# Patient Record
Sex: FEMALE | Race: WHITE | Employment: FULL TIME | URBAN - METROPOLITAN AREA
[De-identification: names, ages, dates, MRNs, and addresses within clinical notes are randomized per-mention and may not be internally consistent; named-entity substitution may affect disease eponyms.]

---

## 2017-05-09 RX ORDER — LORAZEPAM 1 MG/1
TABLET ORAL
Qty: 14 TABLET | Refills: 0 | Status: SHIPPED | OUTPATIENT
Start: 2017-05-09 | End: 2017-05-31 | Stop reason: SDUPTHER

## 2017-05-10 RX ORDER — LORAZEPAM 1 MG/1
TABLET ORAL
Qty: 30 TABLET | Refills: 0 | OUTPATIENT
Start: 2017-05-10

## 2017-05-31 ENCOUNTER — OFFICE VISIT (OUTPATIENT)
Dept: INTERNAL MEDICINE | Age: 47
End: 2017-05-31

## 2017-05-31 VITALS
WEIGHT: 136 LBS | BODY MASS INDEX: 23.22 KG/M2 | DIASTOLIC BLOOD PRESSURE: 78 MMHG | HEIGHT: 64 IN | SYSTOLIC BLOOD PRESSURE: 110 MMHG

## 2017-05-31 DIAGNOSIS — Z23 NEED FOR DTAP VACCINATION: ICD-10-CM

## 2017-05-31 DIAGNOSIS — F41.0 PANIC DISORDER: ICD-10-CM

## 2017-05-31 DIAGNOSIS — G43.719 INTRACTABLE CHRONIC MIGRAINE WITHOUT AURA AND WITHOUT STATUS MIGRAINOSUS: ICD-10-CM

## 2017-05-31 DIAGNOSIS — Z00.00 ROUTINE GENERAL MEDICAL EXAMINATION AT A HEALTH CARE FACILITY: Primary | ICD-10-CM

## 2017-05-31 DIAGNOSIS — F32.A DEPRESSION, UNSPECIFIED DEPRESSION TYPE: ICD-10-CM

## 2017-05-31 DIAGNOSIS — F51.04 PSYCHOPHYSIOLOGICAL INSOMNIA: ICD-10-CM

## 2017-05-31 PROCEDURE — 93000 ELECTROCARDIOGRAM COMPLETE: CPT | Performed by: INTERNAL MEDICINE

## 2017-05-31 PROCEDURE — 90471 IMMUNIZATION ADMIN: CPT | Performed by: INTERNAL MEDICINE

## 2017-05-31 PROCEDURE — 90715 TDAP VACCINE 7 YRS/> IM: CPT | Performed by: INTERNAL MEDICINE

## 2017-05-31 PROCEDURE — 99396 PREV VISIT EST AGE 40-64: CPT | Performed by: INTERNAL MEDICINE

## 2017-05-31 RX ORDER — HYDROCODONE BITARTRATE AND ACETAMINOPHEN 5; 325 MG/1; MG/1
TABLET ORAL
Qty: 30 TABLET | Refills: 0 | Status: SHIPPED | OUTPATIENT
Start: 2017-05-31 | End: 2018-01-11 | Stop reason: SDUPTHER

## 2017-05-31 RX ORDER — LORAZEPAM 1 MG/1
TABLET ORAL
Qty: 70 TABLET | Refills: 2 | Status: SHIPPED | OUTPATIENT
Start: 2017-05-31 | End: 2017-12-20 | Stop reason: SDUPTHER

## 2017-05-31 RX ORDER — CODEINE/BUTALBITAL/ASA/CAFFEIN 30-50-325
CAPSULE ORAL
Qty: 30 CAPSULE | Refills: 1 | Status: SHIPPED | OUTPATIENT
Start: 2017-05-31 | End: 2017-08-07 | Stop reason: SDUPTHER

## 2017-05-31 ASSESSMENT — ENCOUNTER SYMPTOMS
ABDOMINAL PAIN: 0
BACK PAIN: 0
CONSTIPATION: 0
CHEST TIGHTNESS: 0
WHEEZING: 0
COLOR CHANGE: 0

## 2017-07-21 ENCOUNTER — TELEPHONE (OUTPATIENT)
Dept: INTERNAL MEDICINE | Age: 47
End: 2017-07-21

## 2017-08-08 RX ORDER — CODEINE/BUTALBITAL/ASA/CAFFEIN 30-50-325
CAPSULE ORAL
Qty: 30 CAPSULE | Refills: 1 | Status: SHIPPED | OUTPATIENT
Start: 2017-08-08 | End: 2017-10-22 | Stop reason: SDUPTHER

## 2017-10-25 ENCOUNTER — HOSPITAL ENCOUNTER (OUTPATIENT)
Dept: OTHER | Age: 47
Discharge: HOME | End: 2017-10-25
Attending: PSYCHIATRY & NEUROLOGY | Admitting: PSYCHIATRY & NEUROLOGY

## 2017-10-25 RX ORDER — CODEINE/BUTALBITAL/ASA/CAFFEIN 30-50-325
CAPSULE ORAL
Qty: 30 CAPSULE | Refills: 0 | OUTPATIENT
Start: 2017-10-25 | End: 2017-11-21 | Stop reason: SDUPTHER

## 2017-11-06 ENCOUNTER — TELEPHONE (OUTPATIENT)
Dept: INTERNAL MEDICINE | Age: 47
End: 2017-11-06

## 2017-11-06 RX ORDER — ALPRAZOLAM 0.5 MG/1
TABLET ORAL
Qty: 5 TABLET | Refills: 0 | OUTPATIENT
Start: 2017-11-06 | End: 2018-01-11

## 2017-11-10 ENCOUNTER — TELEPHONE (OUTPATIENT)
Dept: INTERNAL MEDICINE | Age: 47
End: 2017-11-10

## 2017-11-10 NOTE — TELEPHONE ENCOUNTER
Pt states she went to [address] and Rx -xanax was not avaialbal and was not called in (reviewed 11.6.17 med tab )   Pt called needs RX for. .. ALPRAZolam (XANAX) 0.5 MG tablet Sent to . .. Pharm on file . .. Advised Pt to check with Pharm in 24 -48hours . ..  Pls send
rx re-called today  [phone] (home) [phone] (work)    PT INFORMED
Yes

## 2017-12-18 ENCOUNTER — TELEPHONE (OUTPATIENT)
Dept: INTERNAL MEDICINE | Age: 47
End: 2017-12-18

## 2017-12-18 RX ORDER — CODEINE/BUTALBITAL/ASA/CAFFEIN 30-50-325
CAPSULE ORAL
Qty: 30 CAPSULE | Refills: 0 | OUTPATIENT
Start: 2017-12-18 | End: 2018-01-11 | Stop reason: SDUPTHER

## 2017-12-18 NOTE — TELEPHONE ENCOUNTER
Pt called needs RX for. ..butalbital-aspirin-caffeine-codeine (FIORINAL WITH CODEINE) -14-30 MG capsule and ALPRAZolam (XANAX) 0.5 MG tablet  Sent to . .. Pharm on file . .. Advised Pt to check with Pharm in 24-48 hours . ..  Pls send  Pending med check appt 1.2.18    Pt can be reached at [phone]

## 2017-12-20 ENCOUNTER — TELEPHONE (OUTPATIENT)
Dept: INTERNAL MEDICINE | Age: 47
End: 2017-12-20

## 2017-12-20 RX ORDER — LORAZEPAM 2 MG/1
TABLET ORAL
Qty: 30 TABLET | Refills: 0 | OUTPATIENT
Start: 2017-12-20 | End: 2018-01-11 | Stop reason: SDUPTHER

## 2017-12-20 NOTE — TELEPHONE ENCOUNTER
Patient called yesterday for refills on medication she only received the refill on butalbital-aspirin-caffeine-codeine (FIORINAL WITH CODEINE) -54-30 MG capsule     Pt didn't received the refill for LORazepam (ATIVAN) 1 MG tablet 30 day supply   Please resend refill request to [facility]

## 2018-01-11 ENCOUNTER — OFFICE VISIT (OUTPATIENT)
Dept: INTERNAL MEDICINE | Age: 48
End: 2018-01-11

## 2018-01-11 VITALS
DIASTOLIC BLOOD PRESSURE: 80 MMHG | BODY MASS INDEX: 24.41 KG/M2 | TEMPERATURE: 99 F | SYSTOLIC BLOOD PRESSURE: 122 MMHG | WEIGHT: 143 LBS | HEIGHT: 64 IN

## 2018-01-11 DIAGNOSIS — F32.A DEPRESSION, UNSPECIFIED DEPRESSION TYPE: ICD-10-CM

## 2018-01-11 DIAGNOSIS — F41.0 PANIC DISORDER: ICD-10-CM

## 2018-01-11 DIAGNOSIS — G43.719 INTRACTABLE CHRONIC MIGRAINE WITHOUT AURA AND WITHOUT STATUS MIGRAINOSUS: Primary | ICD-10-CM

## 2018-01-11 DIAGNOSIS — F51.04 PSYCHOPHYSIOLOGICAL INSOMNIA: ICD-10-CM

## 2018-01-11 DIAGNOSIS — B34.9 VIRAL SYNDROME: ICD-10-CM

## 2018-01-11 PROCEDURE — 99214 OFFICE O/P EST MOD 30 MIN: CPT | Performed by: INTERNAL MEDICINE

## 2018-01-11 RX ORDER — OSELTAMIVIR PHOSPHATE 75 MG/1
75 CAPSULE ORAL 2 TIMES DAILY
Qty: 14 CAPSULE | Refills: 0 | Status: SHIPPED | OUTPATIENT
Start: 2018-01-11 | End: 2018-01-18

## 2018-01-11 RX ORDER — CODEINE/BUTALBITAL/ASA/CAFFEIN 30-50-325
CAPSULE ORAL
Qty: 30 CAPSULE | Refills: 0 | Status: SHIPPED | OUTPATIENT
Start: 2018-01-11 | End: 2018-02-11

## 2018-01-11 RX ORDER — PROMETHAZINE HYDROCHLORIDE AND CODEINE PHOSPHATE 6.25; 1 MG/5ML; MG/5ML
5 SYRUP ORAL EVERY 4 HOURS PRN
Qty: 240 ML | Refills: 1 | Status: SHIPPED | OUTPATIENT
Start: 2018-01-11 | End: 2018-02-12

## 2018-01-11 RX ORDER — LORAZEPAM 2 MG/1
TABLET ORAL
Qty: 30 TABLET | Refills: 1 | Status: SHIPPED | OUTPATIENT
Start: 2018-01-11 | End: 2018-02-11

## 2018-01-11 RX ORDER — HYDROCODONE BITARTRATE AND ACETAMINOPHEN 5; 325 MG/1; MG/1
TABLET ORAL
Qty: 30 TABLET | Refills: 0 | Status: SHIPPED | OUTPATIENT
Start: 2018-01-11 | End: 2018-07-17 | Stop reason: SDUPTHER

## 2018-01-11 ASSESSMENT — ENCOUNTER SYMPTOMS
COLOR CHANGE: 0
COUGH: 1
CHEST TIGHTNESS: 0
BACK PAIN: 0
WHEEZING: 0
ABDOMINAL PAIN: 0

## 2018-01-11 ASSESSMENT — PATIENT HEALTH QUESTIONNAIRE - PHQ9
SUM OF ALL RESPONSES TO PHQ QUESTIONS 1-9: 0
SUM OF ALL RESPONSES TO PHQ9 QUESTIONS 1 & 2: 0
2. FEELING DOWN, DEPRESSED OR HOPELESS: 0

## 2018-01-11 NOTE — LETTER
· I will protect my prescriptions and medications. I understand that lost or misplaced prescriptions will not be replaced. · I will keep medications only for my own use and will not share them with others. I will keep all medications away from children. · I agree to participate in any medical, psychological or psychiatric assessments recommended by my provider. · I will actively participate in any program designed to improve function, including social, physical, psychological and daily or work activities. 2. I will not use illegal or street drugs or another person's prescription. If I have an       addiction problem with drugs or alcohol and my provider asks me to enter a program       to address this issue, I agree to follow through. Such programs may include:    · 12-Step program and securing a sponsor  · Individual counseling   · Inpatient or outpatient treatment  · Other:___________________________    If in treatment, I will request that a copy of the programs initial evaluation and treatment recommendations be sent to this provider and will not expect refills until that is received. I will also request written monthly updates be sent to this provider to verify my continuing treatment. 3. I will consent to random drug screening to assure I am only taking the prescribed drugs, described in this MEDICATION AGREEMENT. I understand that a drug screen is a laboratory test in which a sample of my urine or blood is checked to see what drugs I have been taking. 4. I will keep all my scheduled appointments. If I need to cancel my appointment I will do so, a minimum of 24 hours before it is scheduled. 5. I understand that this provider may stop prescribing the medications listed if:    · I do not show any improvement in pain or my activity has not improved. · I develop rapid tolerance or loss of improvement as described in my treatment plan. · I develop significant side effects from the medication. · My behavior is inconsistent with the responsibilities outlined above, which may also result in being prevented from receiving further care from this office. Medication Agreement:      I, ____________________________________, have agreed to use the following medications above as instructed by my physician and as stated in this [facility].      Patient Signature:  ______________________        Date:    ______________________    Patient Name Printed: ______________________    Physician Signature:  ______________________           Date:   ______________________      REV. 9-09

## 2018-01-11 NOTE — LETTER
· I will protect my prescriptions and medications. I understand that lost or misplaced prescriptions will not be replaced. · I will keep medications only for my own use and will not share them with others. I will keep all medications away from children. · I agree to participate in any medical, psychological or psychiatric assessments recommended by my provider. · I will actively participate in any program designed to improve function, including social, physical, psychological and daily or work activities. 2. I will not use illegal or street drugs or another person's prescription. If I have an       addiction problem with drugs or alcohol and my provider asks me to enter a program       to address this issue, I agree to follow through. Such programs may include:    · 12-Step program and securing a sponsor  · Individual counseling   · Inpatient or outpatient treatment  · Other:___________________________    If in treatment, I will request that a copy of the programs initial evaluation and treatment recommendations be sent to this provider and will not expect refills until that is received. I will also request written monthly updates be sent to this provider to verify my continuing treatment. 3. I will consent to random drug screening to assure I am only taking the prescribed drugs, described in this MEDICATION AGREEMENT. I understand that a drug screen is a laboratory test in which a sample of my urine or blood is checked to see what drugs I have been taking. 4. I will keep all my scheduled appointments. If I need to cancel my appointment I will do so, a minimum of 24 hours before it is scheduled. 5. I understand that this provider may stop prescribing the medications listed if:    · I do not show any improvement in pain or my activity has not improved. · I develop rapid tolerance or loss of improvement as described in my treatment plan. · I develop significant side effects from the medication.

## 2018-01-11 NOTE — PROGRESS NOTES
content normal.         Assessment and Plan:      Viral syndrome  R/o flu but pro;ylax due to son's issues     Insomnia  Cont lorazepam     Depression  Consider getting counseling again if possible    Panic disorder  Encouraged to seek counseling if doesn't resolve     Intractable chronic migraine without aura  Fair control w curent regimen-avoid norco if at all possible        Encounter Diagnoses   Name Primary?  Intractable chronic migraine without aura and without status migrainosus Yes    Panic disorder     Psychophysiological insomnia     Viral syndrome     Depression, unspecified depression type        No orders of the defined types were placed in this encounter.

## 2018-01-29 ENCOUNTER — TELEPHONE (OUTPATIENT)
Dept: INTERNAL MEDICINE | Age: 48
End: 2018-01-29

## 2018-01-29 DIAGNOSIS — F41.0 PANIC DISORDER: ICD-10-CM

## 2018-01-29 DIAGNOSIS — G43.719 INTRACTABLE CHRONIC MIGRAINE WITHOUT AURA AND WITHOUT STATUS MIGRAINOSUS: ICD-10-CM

## 2018-01-29 DIAGNOSIS — F51.04 PSYCHOPHYSIOLOGICAL INSOMNIA: ICD-10-CM

## 2018-01-29 NOTE — TELEPHONE ENCOUNTER
Harsh calling from [address] would like to be advised if it is ok to fill Norco and butalbital-aspirin-caffeine-codeine last filled on  1.11.18  Aware that Vernon Khanna MD out however message will be sent to be reviewed by doctor on duty

## 2018-01-29 NOTE — TELEPHONE ENCOUNTER
[facility] is calling because there's a drug interaction HYDROcodone-acetaminophen (NORCO) 5-325 MG per tablet and butalbital-aspirin-caffeine-codeine (FIORINAL WITH CODEINE) -37-30 MG capsule     Those 2 medication interact with her LORazepam (ATIVAN) 2 MG tablet     Please call [facility]

## 2018-03-18 DIAGNOSIS — G44.221 CHRONIC TENSION-TYPE HEADACHE, INTRACTABLE: Primary | ICD-10-CM

## 2018-03-19 RX ORDER — CODEINE/BUTALBITAL/ASA/CAFFEIN 30-50-325
CAPSULE ORAL
Qty: 30 CAPSULE | Refills: 0 | Status: SHIPPED | OUTPATIENT
Start: 2018-03-19 | End: 2018-04-18 | Stop reason: SDUPTHER

## 2018-05-20 DIAGNOSIS — G44.221 CHRONIC TENSION-TYPE HEADACHE, INTRACTABLE: ICD-10-CM

## 2018-05-21 RX ORDER — CODEINE/BUTALBITAL/ASA/CAFFEIN 30-50-325
CAPSULE ORAL
Qty: 30 CAPSULE | Refills: 0 | OUTPATIENT
Start: 2018-05-21 | End: 2018-07-02 | Stop reason: SDUPTHER

## 2018-05-21 RX ORDER — LORAZEPAM 2 MG/1
TABLET ORAL
Qty: 30 TABLET | Refills: 0 | OUTPATIENT
Start: 2018-05-21 | End: 2018-07-02 | Stop reason: SDUPTHER

## 2018-06-29 DIAGNOSIS — G43.719 INTRACTABLE CHRONIC MIGRAINE WITHOUT AURA AND WITHOUT STATUS MIGRAINOSUS: Primary | ICD-10-CM

## 2018-06-29 DIAGNOSIS — F41.0 PANIC DISORDER: ICD-10-CM

## 2018-06-29 NOTE — TELEPHONE ENCOUNTER
MD to review upon return. 7/3    [facility] RAN-    05/21/2018 1 05/21/2018 LORAZEPAM 2 MG TABLET 30 30 [place] HAS [id number] TNS(6705) 0 2.00 LME [place]      05/21/2018 1 05/21/2018 BUTALBITAL COMP-CODEINE #3 CAP 30 10 [place] HAS [id number] JPN(7009) 0 13.50 MME [place]

## 2018-07-02 ENCOUNTER — TELEPHONE (OUTPATIENT)
Dept: INTERNAL MEDICINE | Age: 48
End: 2018-07-02

## 2018-07-02 DIAGNOSIS — F41.0 PANIC DISORDER: Primary | ICD-10-CM

## 2018-07-02 DIAGNOSIS — G44.221 CHRONIC TENSION-TYPE HEADACHE, INTRACTABLE: ICD-10-CM

## 2018-07-02 NOTE — TELEPHONE ENCOUNTER
[facility] ran  Fiorinal with codeine 5/21/18  Ativan last filled 5/21/18  Last seen 1/11/18  Next appt 7/17/18

## 2018-07-03 RX ORDER — CODEINE/BUTALBITAL/ASA/CAFFEIN 30-50-325
CAPSULE ORAL
Qty: 30 CAPSULE | Refills: 0 | OUTPATIENT
Start: 2018-07-03 | End: 2018-07-17 | Stop reason: SDUPTHER

## 2018-07-03 RX ORDER — LORAZEPAM 2 MG/1
TABLET ORAL
Qty: 30 TABLET | Refills: 0 | OUTPATIENT
Start: 2018-07-03 | End: 2018-07-17 | Stop reason: SDUPTHER

## 2018-07-03 RX ORDER — CODEINE/BUTALBITAL/ASA/CAFFEIN 30-50-325
CAPSULE ORAL
Qty: 30 CAPSULE | Refills: 2 | OUTPATIENT
Start: 2018-07-03 | End: 2018-07-17

## 2018-07-03 RX ORDER — LORAZEPAM 2 MG/1
TABLET ORAL
Qty: 30 TABLET | Refills: 2 | OUTPATIENT
Start: 2018-07-03 | End: 2018-08-02

## 2018-07-03 NOTE — TELEPHONE ENCOUNTER
Rx's called in to [place]. Spoke with pt. She is scheduled for med check on 7/16, she will schedule a physical when she is here.

## 2018-07-17 ENCOUNTER — OFFICE VISIT (OUTPATIENT)
Dept: INTERNAL MEDICINE | Age: 48
End: 2018-07-17

## 2018-07-17 VITALS
WEIGHT: 141 LBS | SYSTOLIC BLOOD PRESSURE: 122 MMHG | BODY MASS INDEX: 24.07 KG/M2 | DIASTOLIC BLOOD PRESSURE: 82 MMHG | HEIGHT: 64 IN

## 2018-07-17 DIAGNOSIS — F41.0 PANIC DISORDER: ICD-10-CM

## 2018-07-17 DIAGNOSIS — K59.01 SLOW TRANSIT CONSTIPATION: ICD-10-CM

## 2018-07-17 DIAGNOSIS — G43.719 INTRACTABLE CHRONIC MIGRAINE WITHOUT AURA AND WITHOUT STATUS MIGRAINOSUS: ICD-10-CM

## 2018-07-17 DIAGNOSIS — F51.04 PSYCHOPHYSIOLOGICAL INSOMNIA: ICD-10-CM

## 2018-07-17 DIAGNOSIS — F32.A DEPRESSION, UNSPECIFIED DEPRESSION TYPE: ICD-10-CM

## 2018-07-17 PROBLEM — B34.9 VIRAL SYNDROME: Status: RESOLVED | Noted: 2018-01-11 | Resolved: 2018-07-17

## 2018-07-17 PROCEDURE — 99214 OFFICE O/P EST MOD 30 MIN: CPT | Performed by: INTERNAL MEDICINE

## 2018-07-17 RX ORDER — LORAZEPAM 2 MG/1
TABLET ORAL
Qty: 30 TABLET | Refills: 1 | Status: SHIPPED | OUTPATIENT
Start: 2018-07-17 | End: 2018-10-01

## 2018-07-17 RX ORDER — HYDROCODONE BITARTRATE AND ACETAMINOPHEN 5; 325 MG/1; MG/1
TABLET ORAL
Qty: 30 TABLET | Refills: 0 | Status: SHIPPED | OUTPATIENT
Start: 2018-07-17 | End: 2018-08-17

## 2018-07-17 RX ORDER — CODEINE/BUTALBITAL/ASA/CAFFEIN 30-50-325
CAPSULE ORAL
Qty: 30 CAPSULE | Refills: 1 | Status: SHIPPED | OUTPATIENT
Start: 2018-07-17 | End: 2018-09-17

## 2018-07-17 ASSESSMENT — ENCOUNTER SYMPTOMS
BACK PAIN: 0
CHEST TIGHTNESS: 0
WHEEZING: 0
ABDOMINAL PAIN: 0
COLOR CHANGE: 0

## 2018-10-15 ENCOUNTER — TELEPHONE (OUTPATIENT)
Dept: INTERNAL MEDICINE CLINIC | Age: 48
End: 2018-10-15

## 2018-10-15 RX ORDER — CYCLOBENZAPRINE HCL 10 MG
10 TABLET ORAL 3 TIMES DAILY PRN
Qty: 30 TABLET | Refills: 1 | Status: SHIPPED | OUTPATIENT
Start: 2018-10-15 | End: 2018-10-25

## 2018-10-15 RX ORDER — DICLOFENAC SODIUM 75 MG/1
75 TABLET, DELAYED RELEASE ORAL 2 TIMES DAILY WITH MEALS
Qty: 30 TABLET | Refills: 1 | Status: SHIPPED | OUTPATIENT
Start: 2018-10-15 | End: 2021-10-05 | Stop reason: ALTCHOICE

## 2018-10-15 NOTE — TELEPHONE ENCOUNTER
Pt stated she threw her back out last night and now she is in so much pain and would like to know if something could be called into her pharmacy? [facility] [address] [facility], [facility] [phone]    pls advise. ..  Pt would like a call back regarding this matter

## 2018-12-06 ENCOUNTER — TELEPHONE (OUTPATIENT)
Dept: INTERNAL MEDICINE CLINIC | Age: 48
End: 2018-12-06

## 2018-12-06 NOTE — TELEPHONE ENCOUNTER
LORazepam (ATIVAN) 2 MG tablet- pt stated rx was denied and she would like to know why. .. pls advise    [facility] [address] [place] [address], [address] [place] [address] -  [phone]

## 2019-01-03 ENCOUNTER — TELEPHONE (OUTPATIENT)
Dept: INTERNAL MEDICINE CLINIC | Age: 49
End: 2019-01-03

## 2019-01-03 DIAGNOSIS — B34.9 VIRAL SYNDROME: ICD-10-CM

## 2019-01-03 RX ORDER — PROMETHAZINE HYDROCHLORIDE AND CODEINE PHOSPHATE 6.25; 1 MG/5ML; MG/5ML
5 SYRUP ORAL EVERY 4 HOURS PRN
Qty: 240 ML | Refills: 0 | OUTPATIENT
Start: 2019-01-03 | End: 2019-03-27

## 2019-01-14 DIAGNOSIS — F51.04 PSYCHOPHYSIOLOGICAL INSOMNIA: Primary | ICD-10-CM

## 2019-01-15 RX ORDER — LORAZEPAM 2 MG/1
TABLET ORAL
Qty: 30 TABLET | Refills: 0 | OUTPATIENT
Start: 2019-01-15 | End: 2019-03-27 | Stop reason: SDUPTHER

## 2019-02-14 ENCOUNTER — TELEPHONE (OUTPATIENT)
Dept: INTERNAL MEDICINE CLINIC | Age: 49
End: 2019-02-14

## 2019-02-21 ENCOUNTER — TELEPHONE (OUTPATIENT)
Dept: INTERNAL MEDICINE CLINIC | Age: 49
End: 2019-02-21

## 2019-03-27 ENCOUNTER — OFFICE VISIT (OUTPATIENT)
Dept: INTERNAL MEDICINE CLINIC | Age: 49
End: 2019-03-27
Payer: COMMERCIAL

## 2019-03-27 VITALS
DIASTOLIC BLOOD PRESSURE: 70 MMHG | BODY MASS INDEX: 23.73 KG/M2 | SYSTOLIC BLOOD PRESSURE: 110 MMHG | WEIGHT: 139 LBS | HEIGHT: 64 IN

## 2019-03-27 DIAGNOSIS — F41.0 PANIC DISORDER: ICD-10-CM

## 2019-03-27 DIAGNOSIS — G43.719 INTRACTABLE CHRONIC MIGRAINE WITHOUT AURA AND WITHOUT STATUS MIGRAINOSUS: ICD-10-CM

## 2019-03-27 DIAGNOSIS — Z12.9 CANCER SCREENING: ICD-10-CM

## 2019-03-27 DIAGNOSIS — H35.9: Primary | ICD-10-CM

## 2019-03-27 DIAGNOSIS — D68.59 HYPERCOAGULABLE STATE (HCC): ICD-10-CM

## 2019-03-27 DIAGNOSIS — F32.A DEPRESSION, UNSPECIFIED DEPRESSION TYPE: ICD-10-CM

## 2019-03-27 DIAGNOSIS — F51.04 PSYCHOPHYSIOLOGICAL INSOMNIA: ICD-10-CM

## 2019-03-27 PROCEDURE — 99396 PREV VISIT EST AGE 40-64: CPT | Performed by: INTERNAL MEDICINE

## 2019-03-27 RX ORDER — LORAZEPAM 2 MG/1
TABLET ORAL
Qty: 30 TABLET | Refills: 1 | Status: SHIPPED | OUTPATIENT
Start: 2019-03-27 | End: 2019-05-27

## 2019-03-27 RX ORDER — KETOROLAC TROMETHAMINE 30 MG/ML
INJECTION, SOLUTION INTRAMUSCULAR; INTRAVENOUS
Qty: 4 ML | Refills: 0 | Status: SHIPPED | OUTPATIENT
Start: 2019-03-27 | End: 2020-01-28

## 2019-03-27 RX ORDER — HYDROCODONE BITARTRATE AND ACETAMINOPHEN 5; 325 MG/1; MG/1
1 TABLET ORAL EVERY 8 HOURS PRN
Qty: 30 TABLET | Refills: 0 | Status: SHIPPED | OUTPATIENT
Start: 2019-03-27 | End: 2019-10-31 | Stop reason: SDUPTHER

## 2019-03-27 RX ORDER — CODEINE/BUTALBITAL/ASA/CAFFEIN 30-50-325
1 CAPSULE ORAL EVERY 6 HOURS PRN
Qty: 30 CAPSULE | Refills: 0 | Status: SHIPPED | OUTPATIENT
Start: 2019-03-27 | End: 2019-04-26

## 2019-03-27 ASSESSMENT — ENCOUNTER SYMPTOMS
COLOR CHANGE: 0
ABDOMINAL PAIN: 0
CONSTIPATION: 0
WHEEZING: 0
CHEST TIGHTNESS: 0
BACK PAIN: 0

## 2019-03-27 ASSESSMENT — PATIENT HEALTH QUESTIONNAIRE - PHQ9
1. LITTLE INTEREST OR PLEASURE IN DOING THINGS: 1
SUM OF ALL RESPONSES TO PHQ QUESTIONS 1-9: 1
2. FEELING DOWN, DEPRESSED OR HOPELESS: 0
SUM OF ALL RESPONSES TO PHQ9 QUESTIONS 1 & 2: 1
SUM OF ALL RESPONSES TO PHQ QUESTIONS 1-9: 1

## 2019-03-28 DIAGNOSIS — G43.719 INTRACTABLE CHRONIC MIGRAINE WITHOUT AURA AND WITHOUT STATUS MIGRAINOSUS: ICD-10-CM

## 2019-04-08 ENCOUNTER — TELEPHONE (OUTPATIENT)
Dept: INTERNAL MEDICINE CLINIC | Age: 49
End: 2019-04-08

## 2019-04-08 NOTE — TELEPHONE ENCOUNTER
Patient is depressed and would like a prescription to take the edge off. Patient is crying and said she needs something.   Patient states she is seeing a therapist.     [facility] [address] [place] [address], [address] [place] [address] -  [phone]  Associate Signed OrdersProvidersCurrent Interactions

## 2019-04-08 NOTE — TELEPHONE ENCOUNTER
This is not something we can do over the phone-she needs to come in to discuss her symptoms and we can decide what med is best for her- OK to squeeze her in whenever its best for her-I know she was just here but I really can't start  Antidepressants without a discussion/office visit

## 2019-04-11 ENCOUNTER — OFFICE VISIT (OUTPATIENT)
Dept: INTERNAL MEDICINE CLINIC | Age: 49
End: 2019-04-11
Payer: COMMERCIAL

## 2019-04-11 VITALS
BODY MASS INDEX: 23.56 KG/M2 | WEIGHT: 138 LBS | HEIGHT: 64 IN | DIASTOLIC BLOOD PRESSURE: 80 MMHG | SYSTOLIC BLOOD PRESSURE: 122 MMHG

## 2019-04-11 DIAGNOSIS — F32.A DEPRESSION, UNSPECIFIED DEPRESSION TYPE: ICD-10-CM

## 2019-04-11 DIAGNOSIS — H35.9: ICD-10-CM

## 2019-04-11 PROCEDURE — 99213 OFFICE O/P EST LOW 20 MIN: CPT | Performed by: INTERNAL MEDICINE

## 2019-04-11 PROCEDURE — G0444 DEPRESSION SCREEN ANNUAL: HCPCS | Performed by: INTERNAL MEDICINE

## 2019-04-11 RX ORDER — BUPROPION HYDROCHLORIDE 150 MG/1
150 TABLET, EXTENDED RELEASE ORAL 2 TIMES DAILY
Qty: 60 TABLET | Refills: 3 | Status: SHIPPED | OUTPATIENT
Start: 2019-04-11 | End: 2021-10-05 | Stop reason: ALTCHOICE

## 2019-04-11 ASSESSMENT — PATIENT HEALTH QUESTIONNAIRE - PHQ9
8. MOVING OR SPEAKING SO SLOWLY THAT OTHER PEOPLE COULD HAVE NOTICED. OR THE OPPOSITE, BEING SO FIGETY OR RESTLESS THAT YOU HAVE BEEN MOVING AROUND A LOT MORE THAN USUAL: 1
SUM OF ALL RESPONSES TO PHQ QUESTIONS 1-9: 9
3. TROUBLE FALLING OR STAYING ASLEEP: 1
9. THOUGHTS THAT YOU WOULD BE BETTER OFF DEAD, OR OF HURTING YOURSELF: 0
4. FEELING TIRED OR HAVING LITTLE ENERGY: 1
6. FEELING BAD ABOUT YOURSELF - OR THAT YOU ARE A FAILURE OR HAVE LET YOURSELF OR YOUR FAMILY DOWN: 0
SUM OF ALL RESPONSES TO PHQ9 QUESTIONS 1 & 2: 4
5. POOR APPETITE OR OVEREATING: 1
SUM OF ALL RESPONSES TO PHQ QUESTIONS 1-9: 9
1. LITTLE INTEREST OR PLEASURE IN DOING THINGS: 2
2. FEELING DOWN, DEPRESSED OR HOPELESS: 2
7. TROUBLE CONCENTRATING ON THINGS, SUCH AS READING THE NEWSPAPER OR WATCHING TELEVISION: 1

## 2019-04-11 ASSESSMENT — ENCOUNTER SYMPTOMS
WHEEZING: 0
COLOR CHANGE: 0
BACK PAIN: 0
CONSTIPATION: 0
ABDOMINAL PAIN: 0
CHEST TIGHTNESS: 0

## 2019-04-11 NOTE — PATIENT INSTRUCTIONS
Start w 1 in am for 3 days or so then add 2nd tab around 3-4 pm  If it is helping some after 2-3 weeks call me and we will get you the 300mg once daily dose  If its not helping enough after 3 weeks, call and get in with Dr. Pooja Arreguin in our office (psychiatry) [phone]

## 2019-04-11 NOTE — PROGRESS NOTES
Subjective:      Patient ID: Melvina Cantu is a 46 y.o. female. Chief Complaint   Patient presents with    Depression     Has been having severe depression since death of dog and eye stroke. Seeing Dr. Cornelio Wilson and gets very anxious as mother was treated there. Just found out her sister had lupus and a brain aneurysm. Very fatigued. No motivation or desire to do anything. Staying in bed at home on the weekends. Sleeping all of the time. No suicidal ideation. Still having the daily headaches. Tried the Ajovy and hasn't noticed any improvement. Seeing psychologist regularly now but has no psychiatrist working w her. Is still using ativan to sleep. To have labs including thyroid in early may. Review of Systems   Constitutional: Positive for fatigue. Negative for activity change and appetite change. HENT: Negative for congestion. Eyes: Negative for visual disturbance. Respiratory: Negative for chest tightness and wheezing. Cardiovascular: Negative for chest pain and palpitations. Gastrointestinal: Negative for abdominal pain and constipation. Musculoskeletal: Negative for arthralgias and back pain. Skin: Negative for color change and rash. Neurological: Negative for weakness, light-headedness and headaches. Psychiatric/Behavioral: Positive for dysphoric mood and sleep disturbance. The patient is nervous/anxious. Objective:   Physical Exam   Constitutional: She is oriented to person, place, and time. She appears well-developed and well-nourished. HENT:   Head: Normocephalic and atraumatic. Eyes: Pupils are equal, round, and reactive to light. Conjunctivae and EOM are normal.   Neck: Normal range of motion. Neck supple. Cardiovascular: Normal rate, regular rhythm and normal heart sounds. Pulmonary/Chest: Effort normal and breath sounds normal. No respiratory distress. Abdominal: She exhibits no distension. There is no tenderness.    Lymphadenopathy:     She has no cervical

## 2019-04-30 ENCOUNTER — TELEPHONE (OUTPATIENT)
Dept: INTERNAL MEDICINE CLINIC | Age: 49
End: 2019-04-30

## 2019-05-01 NOTE — TELEPHONE ENCOUNTER
Received DENIAL for Ajovy 225MG/1.5ML SC SOSY. Denial letter attached. Please advise patient. Thank you.

## 2019-05-08 ENCOUNTER — TELEPHONE (OUTPATIENT)
Dept: INTERNAL MEDICINE CLINIC | Age: 49
End: 2019-05-08

## 2019-05-08 NOTE — TELEPHONE ENCOUNTER
Pt called in wanting someone to go over Cologuard results that were done . Has questions.       Please review and advise

## 2019-05-16 NOTE — TELEPHONE ENCOUNTER
Received DENIAL of Appeal for Ajovy 225MG/1.5ML syringes. Denial letter attached. Please advise patient. Thank you.

## 2019-05-29 DIAGNOSIS — F41.0 PANIC DISORDER: Primary | ICD-10-CM

## 2019-05-30 DIAGNOSIS — G43.719 INTRACTABLE CHRONIC MIGRAINE WITHOUT AURA AND WITHOUT STATUS MIGRAINOSUS: ICD-10-CM

## 2019-05-30 RX ORDER — LORAZEPAM 2 MG/1
TABLET ORAL
Qty: 30 TABLET | Refills: 0 | OUTPATIENT
Start: 2019-05-30 | End: 2019-06-03 | Stop reason: SDUPTHER

## 2019-05-30 RX ORDER — CODEINE/BUTALBITAL/ASA/CAFFEIN 30-50-325
CAPSULE ORAL
Qty: 30 CAPSULE | Refills: 0 | OUTPATIENT
Start: 2019-05-30 | End: 2019-06-03 | Stop reason: SDUPTHER

## 2019-06-03 DIAGNOSIS — G43.719 INTRACTABLE CHRONIC MIGRAINE WITHOUT AURA AND WITHOUT STATUS MIGRAINOSUS: ICD-10-CM

## 2019-06-03 DIAGNOSIS — F41.0 PANIC DISORDER: ICD-10-CM

## 2019-06-03 RX ORDER — LORAZEPAM 2 MG/1
TABLET ORAL
Qty: 30 TABLET | Refills: 0 | OUTPATIENT
Start: 2019-06-03 | End: 2019-06-26 | Stop reason: SDUPTHER

## 2019-06-03 RX ORDER — CODEINE/BUTALBITAL/ASA/CAFFEIN 30-50-325
CAPSULE ORAL
Qty: 30 CAPSULE | Refills: 0 | OUTPATIENT
Start: 2019-06-03 | End: 2019-06-06 | Stop reason: SDUPTHER

## 2019-06-05 DIAGNOSIS — G43.719 INTRACTABLE CHRONIC MIGRAINE WITHOUT AURA AND WITHOUT STATUS MIGRAINOSUS: ICD-10-CM

## 2019-06-06 RX ORDER — CODEINE/BUTALBITAL/ASA/CAFFEIN 30-50-325
CAPSULE ORAL
Qty: 30 CAPSULE | Refills: 0 | OUTPATIENT
Start: 2019-06-06 | End: 2019-06-13

## 2019-06-26 ENCOUNTER — TELEPHONE (OUTPATIENT)
Dept: INTERNAL MEDICINE CLINIC | Age: 49
End: 2019-06-26

## 2019-06-26 NOTE — TELEPHONE ENCOUNTER
Pt states [facility] sent over a refill request for LORazepam (ATIVAN) 2 MG tablet     This is a early fill pt requesting a vacation override.

## 2019-09-25 DIAGNOSIS — G44.229 CHRONIC TENSION-TYPE HEADACHE, NOT INTRACTABLE: Primary | ICD-10-CM

## 2019-09-25 RX ORDER — CODEINE/BUTALBITAL/ASA/CAFFEIN 30-50-325
CAPSULE ORAL
Qty: 30 CAPSULE | Refills: 0 | Status: SHIPPED | OUTPATIENT
Start: 2019-09-25 | End: 2019-10-15 | Stop reason: SDUPTHER

## 2019-10-15 ENCOUNTER — OFFICE VISIT (OUTPATIENT)
Dept: INTERNAL MEDICINE CLINIC | Age: 49
End: 2019-10-15
Payer: COMMERCIAL

## 2019-10-15 VITALS
BODY MASS INDEX: 25.1 KG/M2 | HEIGHT: 64 IN | WEIGHT: 147 LBS | SYSTOLIC BLOOD PRESSURE: 118 MMHG | DIASTOLIC BLOOD PRESSURE: 70 MMHG

## 2019-10-15 DIAGNOSIS — G44.229 CHRONIC TENSION-TYPE HEADACHE, NOT INTRACTABLE: ICD-10-CM

## 2019-10-15 DIAGNOSIS — H35.9: ICD-10-CM

## 2019-10-15 DIAGNOSIS — G43.719 INTRACTABLE CHRONIC MIGRAINE WITHOUT AURA AND WITHOUT STATUS MIGRAINOSUS: ICD-10-CM

## 2019-10-15 DIAGNOSIS — F41.0 PANIC DISORDER: ICD-10-CM

## 2019-10-15 DIAGNOSIS — F51.04 PSYCHOPHYSIOLOGICAL INSOMNIA: ICD-10-CM

## 2019-10-15 DIAGNOSIS — M50.90 CERVICAL DISC DISEASE: ICD-10-CM

## 2019-10-15 DIAGNOSIS — F32.A DEPRESSION, UNSPECIFIED DEPRESSION TYPE: ICD-10-CM

## 2019-10-15 PROCEDURE — 99214 OFFICE O/P EST MOD 30 MIN: CPT | Performed by: INTERNAL MEDICINE

## 2019-10-15 RX ORDER — CODEINE/BUTALBITAL/ASA/CAFFEIN 30-50-325
1 CAPSULE ORAL EVERY 8 HOURS PRN
Qty: 30 CAPSULE | Refills: 0 | Status: SHIPPED | OUTPATIENT
Start: 2019-10-15 | End: 2019-10-31

## 2019-10-15 RX ORDER — LORAZEPAM 1 MG/1
TABLET ORAL
Qty: 30 TABLET | Refills: 0 | Status: SHIPPED | OUTPATIENT
Start: 2019-10-15 | End: 2019-10-31

## 2019-10-15 RX ORDER — BUPROPION HYDROCHLORIDE 300 MG/1
300 TABLET ORAL EVERY MORNING
Qty: 90 TABLET | Refills: 3 | Status: SHIPPED | OUTPATIENT
Start: 2019-10-15 | End: 2020-10-20 | Stop reason: SDUPTHER

## 2019-10-15 ASSESSMENT — ENCOUNTER SYMPTOMS
WHEEZING: 0
CHEST TIGHTNESS: 0
ABDOMINAL PAIN: 0
BACK PAIN: 0
COLOR CHANGE: 0

## 2019-10-15 ASSESSMENT — PATIENT HEALTH QUESTIONNAIRE - PHQ9
SUM OF ALL RESPONSES TO PHQ QUESTIONS 1-9: 1
SUM OF ALL RESPONSES TO PHQ9 QUESTIONS 1 & 2: 1
2. FEELING DOWN, DEPRESSED OR HOPELESS: 0
SUM OF ALL RESPONSES TO PHQ QUESTIONS 1-9: 1
1. LITTLE INTEREST OR PLEASURE IN DOING THINGS: 1

## 2019-10-16 ENCOUNTER — TELEPHONE (OUTPATIENT)
Dept: INTERNAL MEDICINE CLINIC | Age: 49
End: 2019-10-16

## 2019-10-30 ENCOUNTER — TELEPHONE (OUTPATIENT)
Dept: INTERNAL MEDICINE CLINIC | Age: 49
End: 2019-10-30

## 2019-10-30 DIAGNOSIS — F51.04 PSYCHOPHYSIOLOGICAL INSOMNIA: ICD-10-CM

## 2019-10-30 DIAGNOSIS — G43.719 INTRACTABLE CHRONIC MIGRAINE WITHOUT AURA AND WITHOUT STATUS MIGRAINOSUS: ICD-10-CM

## 2019-10-30 DIAGNOSIS — F41.0 PANIC DISORDER: ICD-10-CM

## 2019-10-31 RX ORDER — HYDROCODONE BITARTRATE AND ACETAMINOPHEN 5; 325 MG/1; MG/1
1 TABLET ORAL EVERY 8 HOURS PRN
Qty: 30 TABLET | Refills: 0 | Status: SHIPPED | OUTPATIENT
Start: 2019-10-31 | End: 2019-12-01

## 2019-11-14 ENCOUNTER — TELEPHONE (OUTPATIENT)
Dept: INTERNAL MEDICINE CLINIC | Age: 49
End: 2019-11-14

## 2019-11-22 ENCOUNTER — TELEPHONE (OUTPATIENT)
Dept: INTERNAL MEDICINE CLINIC | Age: 49
End: 2019-11-22

## 2019-11-22 DIAGNOSIS — F51.04 PSYCHOPHYSIOLOGICAL INSOMNIA: ICD-10-CM

## 2019-11-22 RX ORDER — LORAZEPAM 1 MG/1
TABLET ORAL
Qty: 30 TABLET | Refills: 0 | OUTPATIENT
Start: 2019-11-22 | End: 2020-01-28 | Stop reason: SDUPTHER

## 2020-01-28 ENCOUNTER — OFFICE VISIT (OUTPATIENT)
Dept: INTERNAL MEDICINE CLINIC | Age: 50
End: 2020-01-28
Payer: COMMERCIAL

## 2020-01-28 VITALS
BODY MASS INDEX: 25.27 KG/M2 | DIASTOLIC BLOOD PRESSURE: 74 MMHG | WEIGHT: 148 LBS | HEIGHT: 64 IN | SYSTOLIC BLOOD PRESSURE: 132 MMHG

## 2020-01-28 PROCEDURE — 93000 ELECTROCARDIOGRAM COMPLETE: CPT | Performed by: INTERNAL MEDICINE

## 2020-01-28 PROCEDURE — 99214 OFFICE O/P EST MOD 30 MIN: CPT | Performed by: INTERNAL MEDICINE

## 2020-01-28 RX ORDER — BUTALBITAL, ACETAMINOPHEN, CAFFEINE AND CODEINE PHOSPHATE 300; 50; 40; 30 MG/1; MG/1; MG/1; MG/1
1 CAPSULE ORAL EVERY 4 HOURS PRN
Qty: 30 CAPSULE | Refills: 0 | Status: SHIPPED | OUTPATIENT
Start: 2020-01-28 | End: 2020-04-02 | Stop reason: SDUPTHER

## 2020-01-28 RX ORDER — GABAPENTIN 300 MG/1
300 CAPSULE ORAL 3 TIMES DAILY
COMMUNITY
End: 2021-10-05 | Stop reason: ALTCHOICE

## 2020-01-28 RX ORDER — LORAZEPAM 1 MG/1
TABLET ORAL
Qty: 30 TABLET | Refills: 0 | Status: SHIPPED | OUTPATIENT
Start: 2020-01-28 | End: 2020-03-02

## 2020-01-28 SDOH — ECONOMIC STABILITY: TRANSPORTATION INSECURITY
IN THE PAST 12 MONTHS, HAS LACK OF TRANSPORTATION KEPT YOU FROM MEETINGS, WORK, OR FROM GETTING THINGS NEEDED FOR DAILY LIVING?: NO

## 2020-01-28 SDOH — ECONOMIC STABILITY: FOOD INSECURITY: WITHIN THE PAST 12 MONTHS, THE FOOD YOU BOUGHT JUST DIDN'T LAST AND YOU DIDN'T HAVE MONEY TO GET MORE.: NEVER TRUE

## 2020-01-28 SDOH — ECONOMIC STABILITY: TRANSPORTATION INSECURITY
IN THE PAST 12 MONTHS, HAS THE LACK OF TRANSPORTATION KEPT YOU FROM MEDICAL APPOINTMENTS OR FROM GETTING MEDICATIONS?: NO

## 2020-01-28 SDOH — ECONOMIC STABILITY: INCOME INSECURITY: HOW HARD IS IT FOR YOU TO PAY FOR THE VERY BASICS LIKE FOOD, HOUSING, MEDICAL CARE, AND HEATING?: NOT HARD AT ALL

## 2020-01-28 SDOH — ECONOMIC STABILITY: FOOD INSECURITY: WITHIN THE PAST 12 MONTHS, YOU WORRIED THAT YOUR FOOD WOULD RUN OUT BEFORE YOU GOT MONEY TO BUY MORE.: NEVER TRUE

## 2020-01-28 ASSESSMENT — ENCOUNTER SYMPTOMS
BACK PAIN: 0
ABDOMINAL PAIN: 0
CHEST TIGHTNESS: 0
WHEEZING: 0
COLOR CHANGE: 0

## 2020-01-28 NOTE — PROGRESS NOTES
fluticasone (FLOVENT HFA) 44 MCG/ACT inhaler Inhale 1 puff into the lungs 2 times daily. No current facility-administered medications for this visit. Vitals:    20 1310   BP: 132/74   Site: Left Upper Arm   Weight: 148 lb (67.1 kg)   Height: 5' 4\" (1.626 m)     Body mass index is 25.4 kg/m².      Wt Readings from Last 3 Encounters:   20 148 lb (67.1 kg)   10/15/19 147 lb (66.7 kg)   19 138 lb (62.6 kg)     BP Readings from Last 3 Encounters:   20 132/74   10/15/19 118/70   19 122/80         Immunization History   Administered Date(s) Administered    DTaP 2006    Hepatitis A 10/27/2015    Influenza Virus Vaccine 10/03/2019    Tdap (Boostrix, Adacel) 2017       Past Medical History:   Diagnosis Date    Allergic rhinitis, cause unspecified     Chronic    Anxiety state, unspecified     Cancer ([facility] [address]     Depressive disorder, not elsewhere classified     Endometriosis     Ganglion cyst     HA (headache)     Hemorrhage of rectum and anus     Insomnia, unspecified     Migraine, unspecified, without mention of intractable migraine without mention of status migrainosus     Chronic    Screening mammogram 2009    Benign    Sprain of neck     Bilateral    Unspecified constipation     Unspecified thrombosed hemorrhoids      Past Surgical History:   Procedure Laterality Date     SECTION      COLONOSCOPY  2008    LAPAROSCOPY  2005    Endometrial    NASAL SEPTUM SURGERY      Reconstruction     Family History   Problem Relation Age of Onset    Depression Father     Anxiety Disorder Father     Anxiety Disorder Mother      Social History     Socioeconomic History    Marital status:      Spouse name: Not on file    Number of children: Not on file    Years of education: Not on file    Highest education level: Not on file   Occupational History    Not on file   Social Needs    Financial resource strain: Not hard

## 2020-01-28 NOTE — LETTER
[place]  [address]  [address] [address]  Phone: [phone]  Fax: [phone]    Dr. Renetta Tate         January 28, 2020       Patient: Ember Ramsey   MR Number: [id number]   YOB: 1970   Date of Visit: 1/28/2020       Dear Dr. Jennifer Juarez :    Thank you for the request for consultation for Paulina Joyce to me for preoperative evaluation prior to cervical disc surgery. Below are the relevant portions of my assessment and plan of care. Subjective:      Patient ID: Ember Ramsey is a 44 y.o. female. Chief Complaint   Patient presents with    Pre-op Exam     disc replacement c5/c7, 2/12, [facility], Dr. Clare Hicks        In for preop prior to cervical disc surgery. Still having severe headaches. Having arm numbness and pain from the cervical disc disease. Anxiety persists. Insomnia is severe. Still has vision issues after CVA. Working on her diet and exercise and has lost some weight. Radha Chávez  1970    Allergies   Allergen Reactions    Penicillins      Current Outpatient Medications   Medication Sig Dispense Refill    gabapentin (NEURONTIN) 300 MG capsule Take 300 mg by mouth 3 times daily.  LORazepam (ATIVAN) 1 MG tablet TAKE 1 TABLET BY MOUTH ONCE DAILY AT BEDTIME 30 tablet 0    butalbital-acetaminophen-caffeine-codeine (FIORICET WITH CODEINE) -35-30 MG per capsule Take 1 capsule by mouth every 4 hours as needed (pain) for up to 30 days.  30 capsule 0    buPROPion (WELLBUTRIN XL) 300 MG extended release tablet Take 1 tablet by mouth every morning 90 tablet 3    buPROPion (WELLBUTRIN SR) 150 MG extended release tablet Take 1 tablet by mouth 2 times daily 60 tablet 3    SYRINGE-NEEDLE, DISP, 3 ML (B-D 3CC LUER-MACIE SYR 25GX1\") 25G X 1\" 3 ML MISC USE FOR TORADOL INJECTION 100 each 0    Fremanezumab-vfrm (AJOVY) 225 MG/1.5ML SOSY Inject 225 mg into the skin every 30 days 1 Syringe 11 Gastrointestinal: Negative for abdominal pain. Musculoskeletal: Positive for arthralgias, myalgias, neck pain and neck stiffness. Negative for back pain. Skin: Negative for color change and rash. Neurological: Negative for weakness, light-headedness and headaches. Psychiatric/Behavioral: Positive for dysphoric mood and sleep disturbance. The patient is nervous/anxious. Objective:   Physical Exam  Constitutional:       Appearance: She is well-developed. HENT:      Head: Normocephalic and atraumatic. Eyes:      Conjunctiva/sclera: Conjunctivae normal.      Pupils: Pupils are equal, round, and reactive to light. Neck:      Musculoskeletal: Normal range of motion and neck supple. Cardiovascular:      Rate and Rhythm: Normal rate and regular rhythm. Heart sounds: Normal heart sounds. Pulmonary:      Effort: Pulmonary effort is normal. No respiratory distress. Breath sounds: Normal breath sounds. Abdominal:      General: There is no distension. Tenderness: There is no abdominal tenderness. Musculoskeletal:         General: Tenderness present. Comments: Point tenderness of cervical spine with para spinal spasm. Decreased rom and pain w rom noted. Lymphadenopathy:      Cervical: No cervical adenopathy. Skin:     General: Skin is warm and dry. Neurological:      Mental Status: She is alert and oriented to person, place, and time. Psychiatric:         Mood and Affect: Mood normal.         Behavior: Behavior normal.         Thought Content:  Thought content normal.         Judgment: Judgment normal.           Assessment and Plan:      Cervical disc disease  For corrective surgery w Dr Valeria Mixon    Panic disorder  Cont prn ativan and doing well -consider seeing therapist if worsens     Depression  Doing well w current regimen but will see therapist again if needed     Intractable chronic migraine without aura

## 2020-04-02 RX ORDER — BUTALBITAL, ACETAMINOPHEN, CAFFEINE AND CODEINE PHOSPHATE 300; 50; 40; 30 MG/1; MG/1; MG/1; MG/1
1 CAPSULE ORAL EVERY 4 HOURS PRN
Qty: 30 CAPSULE | Refills: 0 | Status: SHIPPED | OUTPATIENT
Start: 2020-04-02 | End: 2020-05-02

## 2020-04-02 RX ORDER — BUTALBITAL, ACETAMINOPHEN, CAFFEINE AND CODEINE PHOSPHATE 300; 50; 40; 30 MG/1; MG/1; MG/1; MG/1
1 CAPSULE ORAL EVERY 4 HOURS PRN
Qty: 30 CAPSULE | Refills: 0 | Status: SHIPPED | OUTPATIENT
Start: 2020-04-02 | End: 2020-04-02 | Stop reason: SDUPTHER

## 2020-04-15 ENCOUNTER — TELEPHONE (OUTPATIENT)
Dept: INTERNAL MEDICINE CLINIC | Age: 50
End: 2020-04-15

## 2020-04-16 RX ORDER — LORAZEPAM 1 MG/1
TABLET ORAL
Qty: 60 TABLET | Refills: 0 | OUTPATIENT
Start: 2020-04-16 | End: 2020-05-17

## 2020-04-30 ENCOUNTER — TELEPHONE (OUTPATIENT)
Dept: INTERNAL MEDICINE CLINIC | Age: 50
End: 2020-04-30

## 2020-04-30 RX ORDER — HYDROCORTISONE 25 MG/G
CREAM TOPICAL
Qty: 1 TUBE | Refills: 1 | Status: SHIPPED | OUTPATIENT
Start: 2020-04-30

## 2020-05-14 ENCOUNTER — VIRTUAL VISIT (OUTPATIENT)
Dept: INTERNAL MEDICINE CLINIC | Age: 50
End: 2020-05-14
Payer: COMMERCIAL

## 2020-05-14 VITALS — WEIGHT: 143 LBS | BODY MASS INDEX: 24.41 KG/M2 | TEMPERATURE: 97.1 F | HEIGHT: 64 IN

## 2020-05-14 PROBLEM — Z91.89 AT RISK FOR OSTEOPENIA DUE TO HISTORY OF OSTEOPOROSIS: Status: ACTIVE | Noted: 2020-05-14

## 2020-05-14 PROCEDURE — 99214 OFFICE O/P EST MOD 30 MIN: CPT | Performed by: INTERNAL MEDICINE

## 2020-05-14 RX ORDER — QUETIAPINE FUMARATE 100 MG/1
200 TABLET, FILM COATED ORAL NIGHTLY
Qty: 60 TABLET | Refills: 3 | Status: SHIPPED | OUTPATIENT
Start: 2020-05-14 | End: 2020-09-22

## 2020-06-10 RX ORDER — LORAZEPAM 1 MG/1
TABLET ORAL
Qty: 30 TABLET | Refills: 2 | Status: SHIPPED | OUTPATIENT
Start: 2020-06-10 | End: 2020-09-23

## 2020-07-14 RX ORDER — CODEINE/BUTALBITAL/ASA/CAFFEIN 30-50-325
CAPSULE ORAL
Qty: 30 CAPSULE | OUTPATIENT
Start: 2020-07-14

## 2020-07-16 RX ORDER — CODEINE/BUTALBITAL/ASA/CAFFEIN 30-50-325
CAPSULE ORAL
Qty: 30 CAPSULE | Refills: 0 | Status: SHIPPED | OUTPATIENT
Start: 2020-07-16 | End: 2020-09-09

## 2020-09-22 RX ORDER — QUETIAPINE FUMARATE 100 MG/1
TABLET, FILM COATED ORAL
Qty: 60 TABLET | Refills: 3 | Status: SHIPPED | OUTPATIENT
Start: 2020-09-22 | End: 2020-12-17 | Stop reason: SDUPTHER

## 2020-10-20 RX ORDER — BUPROPION HYDROCHLORIDE 300 MG/1
300 TABLET ORAL EVERY MORNING
Qty: 90 TABLET | Refills: 0 | Status: SHIPPED | OUTPATIENT
Start: 2020-10-20 | End: 2020-12-17

## 2020-10-20 RX ORDER — CODEINE/BUTALBITAL/ASA/CAFFEIN 30-50-325
1 CAPSULE ORAL EVERY 8 HOURS PRN
Qty: 30 CAPSULE | Refills: 0 | Status: SHIPPED | OUTPATIENT
Start: 2020-10-20 | End: 2020-12-17 | Stop reason: SDUPTHER

## 2020-10-20 NOTE — TELEPHONE ENCOUNTER
med refill, but the patient is also reporting severe mirgraines and lingering headache with nausea.  Patient is asking for something for the nausea    Butalbital-Aspirin-Caffeine (FIORINAL PO)     buPROPion (WELLBUTRIN XL) 300 MG extended release tablet     [place] [address] [id number] - [address], [address] - F [phone]

## 2020-10-21 RX ORDER — ONDANSETRON 4 MG/1
4 TABLET, ORALLY DISINTEGRATING ORAL 3 TIMES DAILY PRN
Qty: 30 TABLET | Refills: 0 | Status: SHIPPED | OUTPATIENT
Start: 2020-10-21 | End: 2022-04-13 | Stop reason: SDUPTHER

## 2020-10-21 RX ORDER — BUPROPION HYDROCHLORIDE 300 MG/1
300 TABLET ORAL EVERY MORNING
Qty: 90 TABLET | Refills: 3 | Status: SHIPPED | OUTPATIENT
Start: 2020-10-21 | End: 2021-01-19

## 2020-11-24 ENCOUNTER — OFFICE VISIT (OUTPATIENT)
Dept: ORTHOPEDIC SURGERY | Age: 50
End: 2020-11-24
Payer: COMMERCIAL

## 2020-11-24 VITALS — WEIGHT: 143 LBS | BODY MASS INDEX: 24.41 KG/M2 | HEIGHT: 64 IN

## 2020-11-24 PROCEDURE — 99203 OFFICE O/P NEW LOW 30 MIN: CPT | Performed by: ORTHOPAEDIC SURGERY

## 2020-11-24 PROCEDURE — 20526 THER INJECTION CARP TUNNEL: CPT | Performed by: ORTHOPAEDIC SURGERY

## 2020-11-24 PROCEDURE — 20550 NJX 1 TENDON SHEATH/LIGAMENT: CPT | Performed by: ORTHOPAEDIC SURGERY

## 2020-11-24 RX ORDER — LIDOCAINE HYDROCHLORIDE 10 MG/ML
20 INJECTION, SOLUTION INFILTRATION; PERINEURAL ONCE
Status: COMPLETED | OUTPATIENT
Start: 2020-11-24 | End: 2020-11-24

## 2020-11-24 RX ORDER — TRIAMCINOLONE ACETONIDE 40 MG/ML
40 INJECTION, SUSPENSION INTRA-ARTICULAR; INTRAMUSCULAR ONCE
Status: COMPLETED | OUTPATIENT
Start: 2020-11-24 | End: 2020-11-24

## 2020-11-24 RX ADMIN — TRIAMCINOLONE ACETONIDE 40 MG: 40 INJECTION, SUSPENSION INTRA-ARTICULAR; INTRAMUSCULAR at 11:28

## 2020-11-24 RX ADMIN — TRIAMCINOLONE ACETONIDE 40 MG: 40 INJECTION, SUSPENSION INTRA-ARTICULAR; INTRAMUSCULAR at 11:27

## 2020-11-24 RX ADMIN — LIDOCAINE HYDROCHLORIDE 20 ML: 10 INJECTION, SOLUTION INFILTRATION; PERINEURAL at 11:27

## 2020-11-24 RX ADMIN — LIDOCAINE HYDROCHLORIDE 20 ML: 10 INJECTION, SOLUTION INFILTRATION; PERINEURAL at 11:26

## 2020-11-26 NOTE — PROGRESS NOTES
Chief Complaint   Patient presents with    Wrist Pain     right         HISTORY OF PRESENT ILLNESS:  Jessica Monday is a 44 y.o.  right-hand-dominant patient, , here for a numbness & tingling in the  Right hand and wrist that began approximately 3 to4 months ago. The discomfort does affect sleep at night. she has tried wrist splints. EMG testing: yes -demonstrating evidence of cervical radiculopathy C5-C7 right side as well as right carpal tunnel syndrome and bilateral ulnar neuropathy. Patient denies any history of diabetes mellitus or endocrinopathy  She also reports a history of catching and locking and pain in her right long finger that has been present over the last several months as well. She notices this particularly when she wakes up in the morning as well. No additional treatment to date and no event leading to the onset of symptoms  she was referred for a hand surgery specialty evaluation by: [facility]. Medical History:  Patient's medications, allergies, past medical, surgical, social and family histories were reviewed and updated as appropriate. Past Medical History:   Diagnosis Date    Allergic rhinitis, cause unspecified     Chronic    Anxiety state, unspecified     Cancer ([facility] [address]     Depressive disorder, not elsewhere classified     Endometriosis 2005    Ganglion cyst 2009    HA (headache)     Hemorrhage of rectum and anus     Insomnia, unspecified     Migraine, unspecified, without mention of intractable migraine without mention of status migrainosus     Chronic    Screening mammogram 12/2/2009    Benign    Sprain of neck     Bilateral    Unspecified constipation     Unspecified thrombosed hemorrhoids        ROS:  Pertinent items are noted in HPI  Review of systems reviewed from Patient History Form dated on 11/24/2020  and available in the patient's chart under the Media tab.          PHYSICAL EXAMINATION:    Gen/Psych: Examination reveals a pleasant individual in no acute distress. The patient is oriented to time, place and person. The patient's mood and affect are appropriate. Lymph: The lymphatic examination bilaterally reveals all areas to be without enlargement or induration. Skin intact without lymphadenopathy, discoloration, or abnormal temperature. Vascular: There is intact, symmetric circulation in both upper extremities. Brisk capillary refill all fingers    Musculoskeletal:       Cervical spine, shoulders, elbows, wrist:  satisfactory comfortable baseline range of motion, strength, and stability bilaterally  Negative Spurling sign bilateral upper extremity  Bilateral hand and digits:   Satisfactory range of motion bilaterally with full composite fist and full active extension of all fingers  There is focal tenderness to palpation at the A1 pulley of right long finger and mild triggering with flexion and extension, no evidence of contracture with otherwise full range of motion    5-5 FDS FDP EDC interossei  5-5 EPL FPL thumb abduction  No thenar atrophy or intrinsic muscle wasting.                                        Neurological:  Direct compression, Tinel's, and Phalen's testing reproduces the patient's symptoms into the median nerve distribution right side  Negative Tinel's bilateral cubital tunnel negative elbow hyperflexion test  2+ brachioradialis triceps tendon reflex with negative Nicholas sign bilaterally      Radiology:     X-rays obtained and reviewed in office:  No new images obtained today    DIAGNOSTIC TESTING: EMG: were positive for carpal tunnel  Electrodiagnostic study from October 2019 demonstrating subacute to chronic C5-C7 radiculopathy on the right and C5-C6 radiculopathy on the left as well as moderate median neuropathy across the right wrist and bilateral ulnar sensory neuropathy      IMPRESSION AND PLAN: 53-year-old female presenting with history of right hand numbness and tingling along with right long finger

## 2020-12-16 ENCOUNTER — TELEPHONE (OUTPATIENT)
Dept: INTERNAL MEDICINE CLINIC | Age: 50
End: 2020-12-16

## 2020-12-16 NOTE — TELEPHONE ENCOUNTER
Pt asking for ativan refill -get her in tomorrow or Friday for a vv- tell her so sorry but since its been since may that I saw her last I cannot refill until seen due to 6 mo rule-she also needs to do labs but can do that after our visit- orders are in epic placed 5/20

## 2020-12-17 ENCOUNTER — VIRTUAL VISIT (OUTPATIENT)
Dept: INTERNAL MEDICINE CLINIC | Age: 50
End: 2020-12-17
Payer: COMMERCIAL

## 2020-12-17 PROCEDURE — 99214 OFFICE O/P EST MOD 30 MIN: CPT | Performed by: INTERNAL MEDICINE

## 2020-12-17 RX ORDER — CODEINE/BUTALBITAL/ASA/CAFFEIN 30-50-325
1 CAPSULE ORAL EVERY 8 HOURS PRN
Qty: 30 CAPSULE | Refills: 2 | Status: SHIPPED | OUTPATIENT
Start: 2020-12-17 | End: 2021-04-17 | Stop reason: SDUPTHER

## 2020-12-17 RX ORDER — KETOROLAC TROMETHAMINE 30 MG/ML
INJECTION, SOLUTION INTRAMUSCULAR; INTRAVENOUS
Qty: 4 ML | Refills: 0 | Status: SHIPPED | OUTPATIENT
Start: 2020-12-17 | End: 2022-04-13 | Stop reason: SDUPTHER

## 2020-12-17 RX ORDER — LORAZEPAM 1 MG/1
TABLET ORAL
Qty: 30 TABLET | Refills: 1 | Status: SHIPPED | OUTPATIENT
Start: 2020-12-17 | End: 2021-02-15 | Stop reason: SDUPTHER

## 2020-12-17 RX ORDER — BUPROPION HYDROCHLORIDE 150 MG/1
150 TABLET ORAL EVERY MORNING
Qty: 30 TABLET | Refills: 5 | Status: SHIPPED | OUTPATIENT
Start: 2020-12-17 | End: 2021-06-15 | Stop reason: SDUPTHER

## 2020-12-17 RX ORDER — QUETIAPINE FUMARATE 100 MG/1
TABLET, FILM COATED ORAL
Qty: 90 TABLET | Refills: 3 | Status: SHIPPED | OUTPATIENT
Start: 2020-12-17 | End: 2021-05-12 | Stop reason: SDUPTHER

## 2020-12-17 ASSESSMENT — PATIENT HEALTH QUESTIONNAIRE - PHQ9
SUM OF ALL RESPONSES TO PHQ QUESTIONS 1-9: 2
1. LITTLE INTEREST OR PLEASURE IN DOING THINGS: 1
SUM OF ALL RESPONSES TO PHQ9 QUESTIONS 1 & 2: 2
SUM OF ALL RESPONSES TO PHQ QUESTIONS 1-9: 2
2. FEELING DOWN, DEPRESSED OR HOPELESS: 1
SUM OF ALL RESPONSES TO PHQ QUESTIONS 1-9: 2

## 2020-12-17 ASSESSMENT — ENCOUNTER SYMPTOMS
ABDOMINAL PAIN: 0
COLOR CHANGE: 0
WHEEZING: 0
BACK PAIN: 1
CHEST TIGHTNESS: 0

## 2020-12-17 NOTE — PROGRESS NOTES
2020    TELEHEALTH EVALUATION -- Audio/Visual (During VTUXG-78 public health emergency)    HPI:    Farida Marcus (:  1970) has requested an audio/video evaluation for the following concern(s):    Doing well- has had flu shot- recovered well from neck surgery- had to stop ptx as making migraines worse- they are much worse this past month- started back w botox and hoping it will help - uses tordol rarely-using Wilfredo Landau and works sometimes- definitely triggered by weather- still having severe sleep issues - taking seroquel and ativan- working on her weight and has lost some weight-depression also worse w cloudy winter days and wondering if higher wellbutrin dose would help    Review of Systems   Constitutional: Negative for activity change, appetite change and fatigue. HENT: Negative for congestion. Eyes: Negative for visual disturbance. Respiratory: Negative for chest tightness and wheezing. Cardiovascular: Negative for chest pain and palpitations. Gastrointestinal: Negative for abdominal pain. Musculoskeletal: Positive for arthralgias, back pain, myalgias and neck pain. Skin: Negative for color change and rash. Neurological: Positive for headaches. Negative for weakness and light-headedness. Psychiatric/Behavioral: Positive for dysphoric mood and sleep disturbance. Prior to Visit Medications    Medication Sig Taking? Authorizing Provider   butalbital-aspirin-caffeine-codeine [facility] WITH CODEINE) -61-30 MG capsule Take 1 capsule by mouth every 8 hours as needed for Pain for up to 30 days.  First refill after -last refill by 3/17/2021 Yes Shaista Grey MD   ketorolac (TORADOL) 30 MG/ML injection Inject 1 mL IM PRN for migraine, not to exceed 4mL in 1 month Yes Shaista Grey MD   buPROPion (WELLBUTRIN XL) 150 MG extended release tablet Take 1 tablet by mouth every morning With the 300 mg tab Yes Shaista Grey MD QUEtiapine (SEROQUEL) 100 MG tablet TAKE 2-3  TABLETS BY MOUTH EVERY NIGHT Yes Amauri Drew MD   LORazepam (ATIVAN) 1 MG tablet TAKE 1 TABLET BY MOUTH AT BEDTIME AS NEEDED FOR SLEEP Yes Amauri Drew MD   buPROPion (WELLBUTRIN XL) 300 MG extended release tablet TAKE 1 TABLET BY MOUTH EVERY MORNING Yes Amauri Drew MD   ondansetron (ZOFRAN-ODT) 4 MG disintegrating tablet Take 1 tablet by mouth 3 times daily as needed for Nausea or Vomiting Yes Amauri Drew MD   Butalbital-Aspirin-Caffeine (FIORINAL PO) Take by mouth Yes Historical Provider, MD   hydrocortisone (ANUSOL-HC) 2.5 % CREA rectal cream Apply up to tid Yes Amauri Drew MD   gabapentin (NEURONTIN) 300 MG capsule Take 300 mg by mouth 3 times daily. Yes Historical Provider, MD   buPROPion (WELLBUTRIN SR) 150 MG extended release tablet Take 1 tablet by mouth 2 times daily Yes Amauri Drew MD   SYRINGE-NEEDLE, DISP, 3 ML (B-D 3CC LUER-MACIE SYR 25GX1\") 25G X 1\" 3 ML MISC USE FOR TORADOL INJECTION Yes Amauri Drew MD   Fremanezumab-vfrm (AJOVY) 225 MG/1.5ML SOSY Inject 225 mg into the skin every 30 days Yes Amauri Drew MD   diclofenac (VOLTAREN) 75 MG EC tablet Take 1 tablet by mouth 2 times daily (with meals) Yes Amauri Drew MD   hydrOXYzine (VISTARIL) 25 MG capsule Take 1 capsule by mouth 3 times daily as needed (headache) Yes Rachel Mcneill MD   NEEDLE, DISP, 25 G 25G X 1-1/4\" MISC Use for Toradol injection Yes Rachel Mcneill MD   CAMRESE 0.15-0.03 &0.01 MG TABS  Yes Historical Provider, MD   montelukast (SINGULAIR) 10 MG tablet Take 10 mg by mouth nightly. Yes Historical Provider, MD   azelastine (ASTELIN) 137 MCG/SPRAY nasal spray 1 spray by Nasal route 2 times daily. Use in each nostril as directed  Yes Historical Provider, MD   fluticasone (FLOVENT HFA) 44 MCG/ACT inhaler Inhale 1 puff into the lungs 2 times daily.  Yes Historical Provider, MD       Social History     Tobacco Use    Smoking status: Never Smoker  Smokeless tobacco: Never Used   Substance Use Topics    Alcohol use: Yes     Comment:  social    Drug use: No        Past Medical History:   Diagnosis Date    Allergic rhinitis, cause unspecified     Chronic    Anxiety state, unspecified     Cancer ([address]     Depressive disorder, not elsewhere classified     Endometriosis 2005    Ganglion cyst 2009    HA (headache)     Hemorrhage of rectum and anus     Insomnia, unspecified     Migraine, unspecified, without mention of intractable migraine without mention of status migrainosus     Chronic    Screening mammogram 12/2/2009    Benign    Sprain of neck     Bilateral    Unspecified constipation     Unspecified thrombosed hemorrhoids        Family History   Problem Relation Age of Onset    Depression Father     Anxiety Disorder Father     Anxiety Disorder Mother          PHYSICAL EXAMINATION:  There were no vitals filed for this visit. Constitutional: [x] Appears well-developed and well-nourished [x] No apparent distress      [] Abnormal-   Mental status  [x] Alert and awake  [x] Oriented to person/place/time [x]Able to follow commands      Eyes:  EOM    [x]  Normal  [] Abnormal-  Sclera  [x]  Normal  [] Abnormal -         Discharge [x]  None visible  [] Abnormal -    HENT:   [x] Normocephalic, atraumatic.   [] Abnormal   [x] Mouth/Throat: Mucous membranes are moist.     External Ears [x] Normal  [] Abnormal-     Neck: [x] No visualized mass     Pulmonary/Chest: [x] Respiratory effort normal.  [x] No visualized signs of difficulty breathing or respiratory distress        [] Abnormal-      Musculoskeletal:   [] Normal gait with no signs of ataxia         [x] Normal range of motion of neck        [] Abnormal-       Neurological:        [x] No Facial Asymmetry (Cranial nerve 7 motor function) (limited exam to video visit)          [x] No gaze palsy        [] Abnormal-

## 2020-12-17 NOTE — ASSESSMENT & PLAN NOTE
Cont to f/u w [address] [place] [address] [address] Neurology
Increase seroquel -avoid ambien!
Try to do home stretches
no

## 2020-12-30 ENCOUNTER — OFFICE VISIT (OUTPATIENT)
Dept: ORTHOPEDIC SURGERY | Age: 50
End: 2020-12-30
Payer: COMMERCIAL

## 2020-12-30 VITALS — HEIGHT: 64 IN | BODY MASS INDEX: 24.41 KG/M2 | WEIGHT: 143 LBS

## 2020-12-30 PROCEDURE — 99213 OFFICE O/P EST LOW 20 MIN: CPT | Performed by: ORTHOPAEDIC SURGERY

## 2020-12-30 NOTE — PROGRESS NOTES
cubital tunnel negative elbow hyperflexion test  2+ brachioradialis triceps tendon reflex with negative Nicholas sign bilaterally    Capillary refill brisk all fingers, symmetric  Gross sensation intact to light touch median/ulnar/radial nerves  Sensation intact to radial/ulnar aspect of fingertip        Radiology:    X-rays obtained and reviewed in office:  No new images obtained today    Additional Diagnostic Test Findings:    Office Procedures:  No orders of the defined types were placed in this encounter. Crystal Bajwa MD  Orthopaedic Surgeon, Hand & Upper Extremity   [facility]    Contact Information:  Jarett Chatterjee: [phone] Clinical )    This dictation was performed with a verbal recognition program [facility] [employer] ) and it was checked for errors. It is possible that there are still dictated errors within this office note. If so, please bring any errors to my attention for an addendum. All efforts were made to ensure that this office note is accurate.

## 2021-01-19 RX ORDER — BUPROPION HYDROCHLORIDE 300 MG/1
300 TABLET ORAL EVERY MORNING
Qty: 90 TABLET | Refills: 3 | Status: SHIPPED | OUTPATIENT
Start: 2021-01-19 | End: 2021-06-15 | Stop reason: SDUPTHER

## 2021-02-15 ENCOUNTER — TELEPHONE (OUTPATIENT)
Dept: INTERNAL MEDICINE CLINIC | Age: 51
End: 2021-02-15

## 2021-02-15 DIAGNOSIS — F51.04 PSYCHOPHYSIOLOGICAL INSOMNIA: ICD-10-CM

## 2021-02-15 RX ORDER — LORAZEPAM 1 MG/1
TABLET ORAL
Qty: 30 TABLET | Refills: 2 | Status: SHIPPED | OUTPATIENT
Start: 2021-02-15 | End: 2021-05-21 | Stop reason: SDUPTHER

## 2021-03-10 LAB — PAP SMEAR: NORMAL

## 2021-04-16 DIAGNOSIS — G44.229 CHRONIC TENSION-TYPE HEADACHE, NOT INTRACTABLE: ICD-10-CM

## 2021-04-16 DIAGNOSIS — G43.719 INTRACTABLE CHRONIC MIGRAINE WITHOUT AURA AND WITHOUT STATUS MIGRAINOSUS: Primary | ICD-10-CM

## 2021-04-16 RX ORDER — BUTALBITAL, ASPIRIN, AND CAFFEINE 325; 50; 40 MG/1; MG/1; MG/1
1 CAPSULE ORAL EVERY 4 HOURS PRN
Qty: 30 CAPSULE | Refills: 3 | Status: SHIPPED | OUTPATIENT
Start: 2021-04-16 | End: 2021-05-21 | Stop reason: SDUPTHER

## 2021-04-16 NOTE — TELEPHONE ENCOUNTER
Amanda from pharmacy stated that med sent in was not the pt usual script. Script sent is w/o codeine and the pt gets codeine.   Please confirm and resend med  Thanks

## 2021-04-16 NOTE — TELEPHONE ENCOUNTER
Pt requesting refill. States pharmacy has reached out multiple times w/o response (No request in Epic).   Please advise

## 2021-04-17 RX ORDER — CODEINE/BUTALBITAL/ASA/CAFFEIN 30-50-325
1 CAPSULE ORAL EVERY 8 HOURS PRN
Qty: 30 CAPSULE | Refills: 0 | Status: SHIPPED | OUTPATIENT
Start: 2021-04-17 | End: 2021-12-03 | Stop reason: SDUPTHER

## 2021-04-17 NOTE — TELEPHONE ENCOUNTER
Tell pt I refilled her med for this mo but she needs appt- pe if due in may for a 6 mo check so I can cont to rx narcotics -last visit 12/17

## 2021-05-12 RX ORDER — QUETIAPINE FUMARATE 100 MG/1
TABLET, FILM COATED ORAL
Qty: 90 TABLET | Refills: 3 | Status: SHIPPED | OUTPATIENT
Start: 2021-05-12 | End: 2021-06-15

## 2021-05-21 ENCOUNTER — TELEPHONE (OUTPATIENT)
Dept: INTERNAL MEDICINE CLINIC | Age: 51
End: 2021-05-21

## 2021-05-21 DIAGNOSIS — G43.719 INTRACTABLE CHRONIC MIGRAINE WITHOUT AURA AND WITHOUT STATUS MIGRAINOSUS: ICD-10-CM

## 2021-05-21 DIAGNOSIS — F51.04 PSYCHOPHYSIOLOGICAL INSOMNIA: ICD-10-CM

## 2021-05-21 RX ORDER — BUTALBITAL, ASPIRIN, AND CAFFEINE 325; 50; 40 MG/1; MG/1; MG/1
1 CAPSULE ORAL EVERY 4 HOURS PRN
Qty: 30 CAPSULE | Refills: 0 | OUTPATIENT
Start: 2021-05-21 | End: 2021-06-15 | Stop reason: SDUPTHER

## 2021-05-21 RX ORDER — LORAZEPAM 1 MG/1
TABLET ORAL
Qty: 30 TABLET | Refills: 0 | OUTPATIENT
Start: 2021-05-21 | End: 2021-06-15 | Stop reason: SDUPTHER

## 2021-05-21 NOTE — TELEPHONE ENCOUNTER
Medication Refill:     butalbital-aspirin-caffeine-codeine [facility] WITH CODEINE) -95-30 MG capsule [[id number]    LORazepam (ATIVAN) 1 MG tablet [[id number]    Patient has an appointment scheduled for 06/15/21. She was originally scheduled for 05/17/21then changed to 06/11/21then changed again. This was all do provider changes.       [id number] [address] [address], [address] - [address] [phone]  [phone]

## 2021-06-15 ENCOUNTER — OFFICE VISIT (OUTPATIENT)
Dept: INTERNAL MEDICINE CLINIC | Age: 51
End: 2021-06-15
Payer: COMMERCIAL

## 2021-06-15 VITALS
SYSTOLIC BLOOD PRESSURE: 120 MMHG | TEMPERATURE: 97.6 F | BODY MASS INDEX: 23.22 KG/M2 | HEIGHT: 64 IN | DIASTOLIC BLOOD PRESSURE: 78 MMHG | WEIGHT: 136 LBS

## 2021-06-15 DIAGNOSIS — Z91.89 AT RISK FOR OSTEOPENIA DUE TO HISTORY OF OSTEOPOROSIS: ICD-10-CM

## 2021-06-15 DIAGNOSIS — Z11.4 ENCOUNTER FOR SCREENING FOR HIV: ICD-10-CM

## 2021-06-15 DIAGNOSIS — E55.9 VITAMIN D DEFICIENCY: ICD-10-CM

## 2021-06-15 DIAGNOSIS — G43.719 INTRACTABLE CHRONIC MIGRAINE WITHOUT AURA AND WITHOUT STATUS MIGRAINOSUS: ICD-10-CM

## 2021-06-15 DIAGNOSIS — M85.80 OSTEOPENIA, UNSPECIFIED LOCATION: ICD-10-CM

## 2021-06-15 DIAGNOSIS — F51.04 PSYCHOPHYSIOLOGICAL INSOMNIA: ICD-10-CM

## 2021-06-15 DIAGNOSIS — F41.0 PANIC DISORDER: ICD-10-CM

## 2021-06-15 DIAGNOSIS — Z11.59 ENCOUNTER FOR HEPATITIS C SCREENING TEST FOR LOW RISK PATIENT: ICD-10-CM

## 2021-06-15 DIAGNOSIS — F32.0 CURRENT MILD EPISODE OF MAJOR DEPRESSIVE DISORDER WITHOUT PRIOR EPISODE (HCC): ICD-10-CM

## 2021-06-15 DIAGNOSIS — M85.80 OSTEOPENIA, UNSPECIFIED LOCATION: Primary | ICD-10-CM

## 2021-06-15 LAB
A/G RATIO: 2.2 (ref 1.1–2.2)
ALBUMIN SERPL-MCNC: 5.2 G/DL (ref 3.4–5)
ALP BLD-CCNC: 87 U/L (ref 40–129)
ALT SERPL-CCNC: 18 U/L (ref 10–40)
ANION GAP SERPL CALCULATED.3IONS-SCNC: 13 MMOL/L (ref 3–16)
AST SERPL-CCNC: 21 U/L (ref 15–37)
BASOPHILS ABSOLUTE: 0 K/UL (ref 0–0.2)
BASOPHILS RELATIVE PERCENT: 0.4 %
BILIRUB SERPL-MCNC: 0.3 MG/DL (ref 0–1)
BUN BLDV-MCNC: 12 MG/DL (ref 7–20)
CALCIUM SERPL-MCNC: 10 MG/DL (ref 8.3–10.6)
CHLORIDE BLD-SCNC: 102 MMOL/L (ref 99–110)
CHOLESTEROL, TOTAL: 235 MG/DL (ref 0–199)
CO2: 27 MMOL/L (ref 21–32)
CREAT SERPL-MCNC: 0.8 MG/DL (ref 0.6–1.1)
EOSINOPHILS ABSOLUTE: 0 K/UL (ref 0–0.6)
EOSINOPHILS RELATIVE PERCENT: 0.3 %
GFR AFRICAN AMERICAN: >60
GFR NON-AFRICAN AMERICAN: >60
GLOBULIN: 2.4 G/DL
GLUCOSE BLD-MCNC: 93 MG/DL (ref 70–99)
HCT VFR BLD CALC: 40.6 % (ref 36–48)
HDLC SERPL-MCNC: 70 MG/DL (ref 40–60)
HEMOGLOBIN: 13.5 G/DL (ref 12–16)
HEPATITIS C ANTIBODY INTERPRETATION: NORMAL
LDL CHOLESTEROL CALCULATED: 146 MG/DL
LYMPHOCYTES ABSOLUTE: 2 K/UL (ref 1–5.1)
LYMPHOCYTES RELATIVE PERCENT: 19.6 %
MCH RBC QN AUTO: 30.3 PG (ref 26–34)
MCHC RBC AUTO-ENTMCNC: 33.3 G/DL (ref 31–36)
MCV RBC AUTO: 91.1 FL (ref 80–100)
MONOCYTES ABSOLUTE: 0.4 K/UL (ref 0–1.3)
MONOCYTES RELATIVE PERCENT: 4.3 %
NEUTROPHILS ABSOLUTE: 7.6 K/UL (ref 1.7–7.7)
NEUTROPHILS RELATIVE PERCENT: 75.4 %
PDW BLD-RTO: 14.3 % (ref 12.4–15.4)
PLATELET # BLD: 306 K/UL (ref 135–450)
PMV BLD AUTO: 8 FL (ref 5–10.5)
POTASSIUM SERPL-SCNC: 5 MMOL/L (ref 3.5–5.1)
RBC # BLD: 4.46 M/UL (ref 4–5.2)
SODIUM BLD-SCNC: 142 MMOL/L (ref 136–145)
TOTAL PROTEIN: 7.6 G/DL (ref 6.4–8.2)
TRIGL SERPL-MCNC: 94 MG/DL (ref 0–150)
TSH SERPL DL<=0.05 MIU/L-ACNC: 2.35 UIU/ML (ref 0.27–4.2)
VITAMIN D 25-HYDROXY: 44 NG/ML
VLDLC SERPL CALC-MCNC: 19 MG/DL
WBC # BLD: 10 K/UL (ref 4–11)

## 2021-06-15 PROCEDURE — 99396 PREV VISIT EST AGE 40-64: CPT | Performed by: INTERNAL MEDICINE

## 2021-06-15 RX ORDER — BUPROPION HYDROCHLORIDE 150 MG/1
150 TABLET ORAL EVERY MORNING
Qty: 90 TABLET | Refills: 3 | Status: SHIPPED | OUTPATIENT
Start: 2021-06-15 | End: 2022-04-13 | Stop reason: SDUPTHER

## 2021-06-15 RX ORDER — BUPROPION HYDROCHLORIDE 300 MG/1
300 TABLET ORAL EVERY MORNING
Qty: 90 TABLET | Refills: 3 | Status: SHIPPED | OUTPATIENT
Start: 2021-06-15 | End: 2022-04-13 | Stop reason: SDUPTHER

## 2021-06-15 RX ORDER — LORAZEPAM 1 MG/1
TABLET ORAL
Qty: 90 TABLET | Refills: 0 | Status: SHIPPED | OUTPATIENT
Start: 2021-06-15 | End: 2021-09-25

## 2021-06-15 RX ORDER — BUTALBITAL, ASPIRIN, AND CAFFEINE 325; 50; 40 MG/1; MG/1; MG/1
1 CAPSULE ORAL EVERY 4 HOURS PRN
Qty: 30 CAPSULE | Refills: 1 | Status: SHIPPED | OUTPATIENT
Start: 2021-06-15 | End: 2021-08-26

## 2021-06-15 SDOH — ECONOMIC STABILITY: FOOD INSECURITY: WITHIN THE PAST 12 MONTHS, THE FOOD YOU BOUGHT JUST DIDN'T LAST AND YOU DIDN'T HAVE MONEY TO GET MORE.: NEVER TRUE

## 2021-06-15 SDOH — ECONOMIC STABILITY: FOOD INSECURITY: WITHIN THE PAST 12 MONTHS, YOU WORRIED THAT YOUR FOOD WOULD RUN OUT BEFORE YOU GOT MONEY TO BUY MORE.: NEVER TRUE

## 2021-06-15 ASSESSMENT — ENCOUNTER SYMPTOMS
CHEST TIGHTNESS: 0
COLOR CHANGE: 0
WHEEZING: 0
BACK PAIN: 0
ABDOMINAL PAIN: 0

## 2021-06-15 ASSESSMENT — PATIENT HEALTH QUESTIONNAIRE - PHQ9
2. FEELING DOWN, DEPRESSED OR HOPELESS: 0
SUM OF ALL RESPONSES TO PHQ QUESTIONS 1-9: 1
SUM OF ALL RESPONSES TO PHQ9 QUESTIONS 1 & 2: 1
1. LITTLE INTEREST OR PLEASURE IN DOING THINGS: 1

## 2021-06-15 ASSESSMENT — SOCIAL DETERMINANTS OF HEALTH (SDOH): HOW HARD IS IT FOR YOU TO PAY FOR THE VERY BASICS LIKE FOOD, HOUSING, MEDICAL CARE, AND HEATING?: NOT HARD AT ALL

## 2021-06-15 NOTE — PROGRESS NOTES
Subjective:      Patient ID: Charan Cid is a 44 y.o. female. Chief Complaint   Patient presents with    Annual Exam     yearly/ labs needed,request shingles,dexa,refills needed,seroquel concerns? Having increased headaches and on medrol per neurology-using a lot of fiorinal- seroquel not helping for sleep- only ativan is helping-working w her counselor on anxiety/depression     Nash Chávez  1970    Allergies   Allergen Reactions    Penicillins      Current Outpatient Medications   Medication Sig Dispense Refill    butalbital-aspirin-caffeine (FIORINAL) -40 MG capsule Take 1 capsule by mouth every 4 hours as needed for Headaches for up to 30 days.  30 capsule 1    buPROPion (WELLBUTRIN XL) 300 MG extended release tablet Take 1 tablet by mouth every morning 90 tablet 3    buPROPion (WELLBUTRIN XL) 150 MG extended release tablet Take 1 tablet by mouth every morning With the 300 mg tab 90 tablet 3    LORazepam (ATIVAN) 1 MG tablet TAKE 1 TABLET BY MOUTH AT BEDTIME AS NEEDED FOR SLEEP 90 tablet 0    ketorolac (TORADOL) 30 MG/ML injection Inject 1 mL IM PRN for migraine, not to exceed 4mL in 1 month 4 mL 0    ondansetron (ZOFRAN-ODT) 4 MG disintegrating tablet Take 1 tablet by mouth 3 times daily as needed for Nausea or Vomiting 30 tablet 0    hydrocortisone (ANUSOL-HC) 2.5 % CREA rectal cream Apply up to tid 1 Tube 1    buPROPion (WELLBUTRIN SR) 150 MG extended release tablet Take 1 tablet by mouth 2 times daily 60 tablet 3    SYRINGE-NEEDLE, DISP, 3 ML (B-D 3CC LUER-MACIE SYR 25GX1\") 25G X 1\" 3 ML MISC USE FOR TORADOL INJECTION 100 each 0    Fremanezumab-vfrm (AJOVY) 225 MG/1.5ML SOSY Inject 225 mg into the skin every 30 days 1 Syringe 11    diclofenac (VOLTAREN) 75 MG EC tablet Take 1 tablet by mouth 2 times daily (with meals) 30 tablet 1    hydrOXYzine (VISTARIL) 25 MG capsule Take 1 capsule by mouth 3 times daily as needed (headache) 30 capsule 1    NEEDLE, DISP, 25 G 25G X 1-\" MISC Use for Toradol injection 4 each 0    CAMRESE 0.15-0.03 &0.01 MG TABS       montelukast (SINGULAIR) 10 MG tablet Take 10 mg by mouth nightly.  azelastine (ASTELIN) 137 MCG/SPRAY nasal spray 1 spray by Nasal route 2 times daily. Use in each nostril as directed       fluticasone (FLOVENT HFA) 44 MCG/ACT inhaler Inhale 1 puff into the lungs 2 times daily.  gabapentin (NEURONTIN) 300 MG capsule Take 300 mg by mouth 3 times daily. No current facility-administered medications for this visit. Vitals:    06/15/21 0947   BP: 120/78   Temp: 97.6 °F (36.4 °C)   Weight: 136 lb (61.7 kg)   Height: 5' 4\" (1.626 m)     Body mass index is 23.34 kg/m².      Wt Readings from Last 3 Encounters:   06/15/21 136 lb (61.7 kg)   20 143 lb (64.9 kg)   20 143 lb (64.9 kg)     BP Readings from Last 3 Encounters:   06/15/21 120/78   20 132/74   10/15/19 118/70         Immunization History   Administered Date(s) Administered    DTaP 2006    Hepatitis A 10/27/2015    Influenza Virus Vaccine 10/03/2019, 10/17/2020    Tdap (Boostrix, Adacel) 2017       Past Medical History:   Diagnosis Date    Allergic rhinitis, cause unspecified     Chronic    Anxiety state, unspecified     Cancer ([facility] [address]     Depressive disorder, not elsewhere classified     Endometriosis     Ganglion cyst     HA (headache)     Hemorrhage of rectum and anus     Insomnia, unspecified     Migraine, unspecified, without mention of intractable migraine without mention of status migrainosus     Chronic    Screening mammogram 2009    Benign    Sprain of neck     Bilateral    Unspecified constipation     Unspecified thrombosed hemorrhoids      Past Surgical History:   Procedure Laterality Date     SECTION      COLONOSCOPY  2008    LAPAROSCOPY  2005    Endometrial    NASAL SEPTUM SURGERY      Reconstruction     Family History   Problem Relation Age of Onset    Depression Father     Anxiety Disorder Father     Anxiety Disorder Mother      Social History     Socioeconomic History    Marital status:      Spouse name: Not on file    Number of children: Not on file    Years of education: Not on file    Highest education level: Not on file   Occupational History    Not on file   Tobacco Use    Smoking status: Never Smoker    Smokeless tobacco: Never Used   Vaping Use    Vaping Use: Never used   Substance and Sexual Activity    Alcohol use: Yes     Comment:  social    Drug use: No    Sexual activity: Not Currently   Other Topics Concern    Not on file   Social History Narrative    Not on file     Social Determinants of Health     Financial Resource Strain: Low Risk     Difficulty of Paying Living Expenses: Not hard at all   Food Insecurity: No Food Insecurity    Worried About [employer] in the Last Year: Never true    Lobo of Food in the Last Year: Never true   Transportation Needs:     Lack of Transportation (Medical):  Lack of Transportation (Non-Medical):    Physical Activity:     Days of Exercise per Week:     Minutes of Exercise per Session:    Stress:     Feeling of Stress :    Social Connections:     Frequency of Communication with Friends and Family:     Frequency of Social Gatherings with Friends and Family:     Attends Islam Services:     Active Member of Clubs or Organizations:     Attends Club or Organization Meetings:     Marital Status:    Intimate Partner Violence:     Fear of Current or Ex-Partner:     Emotionally Abused:     Physically Abused:     Sexually Abused:              Review of Systems   Constitutional: Negative for activity change, appetite change and fatigue. HENT: Negative for congestion. Eyes: Negative for visual disturbance. Respiratory: Negative for chest tightness and wheezing. Cardiovascular: Negative for chest pain and palpitations.    Gastrointestinal: Negative for abdominal pain.   Musculoskeletal: Negative for arthralgias and back pain. Skin: Negative for color change and rash. Neurological: Positive for headaches. Negative for weakness and light-headedness. Psychiatric/Behavioral: Positive for dysphoric mood and sleep disturbance. The patient is nervous/anxious. Objective:   Physical Exam  Constitutional:       Appearance: She is well-developed. HENT:      Head: Normocephalic and atraumatic. Eyes:      Conjunctiva/sclera: Conjunctivae normal.      Pupils: Pupils are equal, round, and reactive to light. Cardiovascular:      Rate and Rhythm: Normal rate and regular rhythm. Heart sounds: Normal heart sounds. Pulmonary:      Effort: Pulmonary effort is normal. No respiratory distress. Breath sounds: Normal breath sounds. Abdominal:      General: There is no distension. Tenderness: There is no abdominal tenderness. Musculoskeletal:      Cervical back: Normal range of motion and neck supple. Lymphadenopathy:      Cervical: No cervical adenopathy. Skin:     General: Skin is warm and dry. Neurological:      Mental Status: She is alert and oriented to person, place, and time. Psychiatric:         Mood and Affect: Mood normal.         Behavior: Behavior normal.         Thought Content: Thought content normal.         Judgment: Judgment normal.           Assessment and Plan:      Intractable chronic migraine without aura   Try not to take the fiorinal due to rebound     Panic disorder   Cont meds     Insomnia   Cont lorazepam- seroquel not helping    At risk for osteopenia due to history of osteoporosis   Check dexa now          Complete physical completed. Patient now up to date with all routine screening including Pap and colonoscopy if needed, yearly eye and dental exams,labs, dexa if indicated. Counseled re: nutrition/calciumand vit d supplementation,seat belt use, no cell phone use with driving.

## 2021-06-16 LAB
HIV AG/AB: NORMAL
HIV ANTIGEN: NORMAL
HIV-1 ANTIBODY: NORMAL
HIV-2 AB: NORMAL

## 2021-07-21 ENCOUNTER — HOSPITAL ENCOUNTER (OUTPATIENT)
Dept: GENERAL RADIOLOGY | Age: 51
Discharge: HOME | End: 2021-07-21
Payer: COMMERCIAL

## 2021-07-21 DIAGNOSIS — M85.80 OSTEOPENIA, UNSPECIFIED LOCATION: ICD-10-CM

## 2021-07-21 PROCEDURE — 77080 DXA BONE DENSITY AXIAL: CPT

## 2021-07-26 ENCOUNTER — TELEPHONE (OUTPATIENT)
Dept: INTERNAL MEDICINE CLINIC | Age: 51
End: 2021-07-26

## 2021-07-26 NOTE — TELEPHONE ENCOUNTER
Patient called stating she got the results back from the dexa bone density scan on my chart, but she does have some questions regarding the osteopenia ? Please call to advise.

## 2021-08-26 DIAGNOSIS — G43.719 INTRACTABLE CHRONIC MIGRAINE WITHOUT AURA AND WITHOUT STATUS MIGRAINOSUS: ICD-10-CM

## 2021-08-26 RX ORDER — BUTALBITAL, ASPIRIN, AND CAFFEINE 325; 50; 40 MG/1; MG/1; MG/1
CAPSULE ORAL
Qty: 30 CAPSULE | Refills: 1 | OUTPATIENT
Start: 2021-08-26 | End: 2021-11-03

## 2021-08-30 ENCOUNTER — TELEPHONE (OUTPATIENT)
Dept: INTERNAL MEDICINE CLINIC | Age: 51
End: 2021-08-30

## 2021-08-30 NOTE — LETTER
[place]  [address]  [address] [place] [address]  Phone: [phone]  Fax: [phone]    Stu Ta MD        September 1, 2021     Patient: Osiris Ayala   YOB: 1970   Date of Visit: 9/1/2021       To Whom It May Concern:      Paige Virgen , Require multiple controlled medications:  Listed below:    1)aspirin-caffeine [facility]) -40 MG capsule [[id number]     2)LORazepam (ATIVAN) 1 MG tablet [[id number]     3)buPROPion (WELLBUTRIN XL)     4)QUEtiapine (SEROQUEL) 100 MG tablet [[id number]    These are necessary for her medical condition.     If you have any questions or concerns, please don't hesitate to call.     Sincerely,        Stu Ta MD

## 2021-08-30 NOTE — TELEPHONE ENCOUNTER
Patient called statiing she is looking for a new job and would like a letter from you regarding her taking controlled sustances. She states there are four of them. Please call to advise.

## 2021-08-31 NOTE — TELEPHONE ENCOUNTER
Patient called back stating the 4 medications she wants on the letter are:    butalbital-aspirin-caffeine [facility]) -40 MG capsule [[id number]    LORazepam (ATIVAN) 1 MG tablet [[id number]    buPROPion (WELLBUTRIN XL)    QUEtiapine (SEROQUEL) 100 MG tablet [[id number]    Can you please e-mail this to :  [email or web address]

## 2021-08-31 NOTE — TELEPHONE ENCOUNTER
Ask her which 4 and do we need to put names in chart- I cant tell which ones she is on on my yvonne that I am using sorry!

## 2021-08-31 NOTE — TELEPHONE ENCOUNTER
Sure- can you write it saying or requires the use of them for her multiple medical conditions and they are medically necessary?

## 2021-10-05 ENCOUNTER — OFFICE VISIT (OUTPATIENT)
Dept: INTERNAL MEDICINE CLINIC | Age: 51
End: 2021-10-05
Payer: COMMERCIAL

## 2021-10-05 VITALS
HEART RATE: 85 BPM | HEIGHT: 64 IN | OXYGEN SATURATION: 98 % | BODY MASS INDEX: 23.73 KG/M2 | WEIGHT: 139 LBS | DIASTOLIC BLOOD PRESSURE: 86 MMHG | SYSTOLIC BLOOD PRESSURE: 122 MMHG

## 2021-10-05 DIAGNOSIS — Z01.818 PRE-OPERATIVE EXAM: Primary | ICD-10-CM

## 2021-10-05 PROCEDURE — 99214 OFFICE O/P EST MOD 30 MIN: CPT | Performed by: HOSPITALIST

## 2021-10-05 PROCEDURE — 93000 ELECTROCARDIOGRAM COMPLETE: CPT | Performed by: HOSPITALIST

## 2021-10-05 RX ORDER — QUETIAPINE FUMARATE 100 MG/1
TABLET, FILM COATED ORAL
COMMUNITY
Start: 2020-12-17 | End: 2021-12-17

## 2021-10-05 RX ORDER — TIZANIDINE 4 MG/1
TABLET ORAL
COMMUNITY
Start: 2021-07-05

## 2021-10-05 RX ORDER — VALACYCLOVIR HYDROCHLORIDE 1 G/1
TABLET, FILM COATED ORAL
COMMUNITY
Start: 2021-03-09

## 2021-10-05 RX ORDER — RIMEGEPANT SULFATE 75 MG/75MG
TABLET, ORALLY DISINTEGRATING ORAL
COMMUNITY
Start: 2021-09-07

## 2021-10-05 ASSESSMENT — ENCOUNTER SYMPTOMS
CONSTIPATION: 1
COUGH: 0
BACK PAIN: 0
BLOOD IN STOOL: 0
ABDOMINAL PAIN: 0
ABDOMINAL DISTENTION: 0
NAUSEA: 0
CHEST TIGHTNESS: 0
DIARRHEA: 0
SINUS PRESSURE: 0
VOICE CHANGE: 0
SINUS PAIN: 0
VOMITING: 0
SHORTNESS OF BREATH: 0
SORE THROAT: 0
WHEEZING: 0
TROUBLE SWALLOWING: 0

## 2021-11-22 ENCOUNTER — TELEPHONE (OUTPATIENT)
Dept: INTERNAL MEDICINE CLINIC | Age: 51
End: 2021-11-22

## 2021-11-22 NOTE — TELEPHONE ENCOUNTER
----- Message from Darion Matamoros sent at 11/22/2021  9:39 AM EST -----  Subject: Message to Provider    QUESTIONS  Information for Provider? Patient wants to know if Dr. Robi Zhao would give   her a referral recommendation to a specialty doctor for a family member of   Neftali Metcalf. The person needs for a labrum tear in shoulder. call patient if   there is a phone number to help with this. cb pt   ---------------------------------------------------------------------------  --------------  CALL BACK INFO  What is the best way for the office to contact you? OK to leave message on   voicemail  Preferred Call Back Phone Number? [phone]  ---------------------------------------------------------------------------  --------------  SCRIPT ANSWERS  Relationship to Patient?  Self

## 2021-11-22 NOTE — TELEPHONE ENCOUNTER
For shoulder issues see    Farhan Varghese MD  [address]   [place], [address]  [phone]

## 2021-12-02 DIAGNOSIS — G44.229 CHRONIC TENSION-TYPE HEADACHE, NOT INTRACTABLE: ICD-10-CM

## 2021-12-02 NOTE — TELEPHONE ENCOUNTER
Pt she go a refill on butalbital-aspirin-caffeine [facility]) -40 MG capsule but she said she should be getting    butalbital-aspirin-caffeine-codeine (FIORINAL WITH CODEINE) -23-30 MG capsule

## 2021-12-03 RX ORDER — CODEINE/BUTALBITAL/ASA/CAFFEIN 30-50-325
1 CAPSULE ORAL EVERY 8 HOURS PRN
Qty: 30 CAPSULE | Refills: 0 | Status: SHIPPED | OUTPATIENT
Start: 2021-12-03 | End: 2021-12-17 | Stop reason: SDUPTHER

## 2021-12-17 ENCOUNTER — VIRTUAL VISIT (OUTPATIENT)
Dept: INTERNAL MEDICINE CLINIC | Age: 51
End: 2021-12-17
Payer: COMMERCIAL

## 2021-12-17 DIAGNOSIS — G44.229 CHRONIC TENSION-TYPE HEADACHE, NOT INTRACTABLE: ICD-10-CM

## 2021-12-17 DIAGNOSIS — F41.0 PANIC DISORDER: ICD-10-CM

## 2021-12-17 DIAGNOSIS — G43.719 INTRACTABLE CHRONIC MIGRAINE WITHOUT AURA AND WITHOUT STATUS MIGRAINOSUS: ICD-10-CM

## 2021-12-17 DIAGNOSIS — F32.A DEPRESSION, UNSPECIFIED DEPRESSION TYPE: ICD-10-CM

## 2021-12-17 DIAGNOSIS — H35.9: ICD-10-CM

## 2021-12-17 DIAGNOSIS — F51.04 PSYCHOPHYSIOLOGICAL INSOMNIA: ICD-10-CM

## 2021-12-17 PROCEDURE — 99214 OFFICE O/P EST MOD 30 MIN: CPT | Performed by: INTERNAL MEDICINE

## 2021-12-17 RX ORDER — LORAZEPAM 1 MG/1
TABLET ORAL
Qty: 30 TABLET | Refills: 2 | Status: SHIPPED | OUTPATIENT
Start: 2021-12-17 | End: 2022-04-04 | Stop reason: SDUPTHER

## 2021-12-17 RX ORDER — CODEINE/BUTALBITAL/ASA/CAFFEIN 30-50-325
1 CAPSULE ORAL EVERY 8 HOURS PRN
Qty: 30 CAPSULE | Refills: 2 | Status: SHIPPED | OUTPATIENT
Start: 2021-12-17 | End: 2022-03-29

## 2021-12-17 ASSESSMENT — ENCOUNTER SYMPTOMS
WHEEZING: 0
ABDOMINAL PAIN: 0
COLOR CHANGE: 0
CHEST TIGHTNESS: 0
BACK PAIN: 0

## 2021-12-17 NOTE — PATIENT INSTRUCTIONS
[facility]   Dr. Miguel Bustamante [address] [facility]  [phone]

## 2021-12-17 NOTE — PROGRESS NOTES
Maurilio Preston (:  1970) is a 46 y.o. female,Established patient, here for evaluation of the following chief complaint(s): Medication Check (refills needed)         ASSESSMENT/PLAN:  1. Chronic tension-type headache, not intractable  -     butalbital-aspirin-caffeine-codeine (FIORINAL WITH CODEINE) -91-30 MG capsule; Take 1 capsule by mouth every 8 hours as needed for Pain for up to 30 days. Fill after , Disp-30 capsule, R-2Normal  2. Psychophysiological insomnia  Assessment & Plan:   Cont lorazepam for now  Orders:  -     LORazepam (ATIVAN) 1 MG tablet; 1 qhs-fill after , Disp-30 tablet, R-2Normal  3. Depression, unspecified depression type  Assessment & Plan:   Controlled w wellbutrin  4. Panic disorder  Assessment & Plan:   Doing well recently- cont wellbutrin  5. Retinal disease, right  Assessment & Plan:   No recurrence   6. Intractable chronic migraine without aura and without status migrainosus  Assessment & Plan:   Fiorinal w codeine only med that works-cont for now    Pt advised to get her covid booster     No follow-ups on file. SUBJECTIVE/OBJECTIVE:  Still having severe headaches but seems to be decreasing in frequency- using it only prn- still having some anxiety and depression- seems to be improved w the wellbutrin at higher dose- having some issues w the hot flashes but not a candidate for hormone replacement due to h/o TIA-has gained some weight after recent carpal tunnel surgery - Hasn't been exercising since surgery      Review of Systems   Constitutional: Negative for activity change, appetite change and fatigue. HENT: Negative for congestion. Eyes: Negative for visual disturbance. Respiratory: Negative for chest tightness and wheezing. Cardiovascular: Negative for chest pain and palpitations. Gastrointestinal: Negative for abdominal pain. Musculoskeletal: Negative for arthralgias and back pain. Skin: Negative for color change and rash. Neurological: Positive for headaches. Negative for weakness and light-headedness. Psychiatric/Behavioral: Positive for sleep disturbance. Patient-Reported Vitals 12/17/2021   Patient-Reported Weight 138lb   Patient-Reported Temperature 98.0   Patient-Reported Peak Flow -        Physical Exam    [INSTRUCTIONS:  \"[x]\" Indicates a positive item  \"[]\" Indicates a negative item  -- DELETE ALL ITEMS NOT EXAMINED]    Constitutional: [x] Appears well-developed and well-nourished [x] No apparent distress      [] Abnormal -     Mental status: [x] Alert and awake  [x] Oriented to person/place/time [x] Able to follow commands    [] Abnormal -     Eyes:   EOM    [x]  Normal    [] Abnormal -   Sclera  [x]  Normal    [] Abnormal -          Discharge [x]  None visible   [] Abnormal -     HENT: [x] Normocephalic, atraumatic  [] Abnormal -   [x] Mouth/Throat: Mucous membranes are moist    External Ears [x] Normal  [] Abnormal -    Neck: [x] No visualized mass [] Abnormal -     Pulmonary/Chest: [x] Respiratory effort normal   [x] No visualized signs of difficulty breathing or respiratory distress        [] Abnormal -      Musculoskeletal:   [x] Normal gait with no signs of ataxia         [x] Normal range of motion of neck        [] Abnormal -     Neurological:        [x] No Facial Asymmetry (Cranial nerve 7 motor function) (limited exam due to video visit)          [x] No gaze palsy        [] Abnormal -          Skin:        [x] No significant exanthematous lesions or discoloration noted on facial skin         [] Abnormal -            Psychiatric:       [x] Normal Affect [] Abnormal -        [x] No Hallucinations    Other pertinent observable physical exam findings:-        Garry Dorsey, was evaluated through a synchronous (real-time) audio-video encounter. The patient (or guardian if applicable) is aware that this is a billable service. Verbal consent to proceed has been obtained within the past 12 months.  The visit was conducted pursuant to the emergency declaration under the [facility], [address] [place] [address] authority and the [employer] General Act. Patient identification was verified, and a caregiver was present when appropriate. The patient was located in a state where the provider was credentialed to provide care. An electronic signature was used to authenticate this note.     --Kate Amin MD

## 2022-01-27 ENCOUNTER — TELEPHONE (OUTPATIENT)
Dept: INTERNAL MEDICINE CLINIC | Age: 52
End: 2022-01-27

## 2022-01-27 NOTE — TELEPHONE ENCOUNTER
Pt states she has a cold since this weekend but still feels like she has fluid in her right eye and pressure and wants an antibiotic to help. Pt states she is Covid Negative . [facility] in [facility] verified.

## 2022-01-27 NOTE — TELEPHONE ENCOUNTER
So let her know that it is very rare for adults to have ear INFECTIONS and the pressure is certainly due to eustachian tube blockage from the viral infection- I suggest she take mucinex 2 tabs twice daily and use flonase nasal spray- an antibioitic will not help at all unfortunately

## 2022-03-28 DIAGNOSIS — G44.229 CHRONIC TENSION-TYPE HEADACHE, NOT INTRACTABLE: ICD-10-CM

## 2022-03-29 RX ORDER — CODEINE/BUTALBITAL/ASA/CAFFEIN 30-50-325
CAPSULE ORAL
Qty: 30 CAPSULE | Refills: 1 | Status: SHIPPED | OUTPATIENT
Start: 2022-03-29 | End: 2022-04-13 | Stop reason: SDUPTHER

## 2022-03-29 NOTE — TELEPHONE ENCOUNTER
Tell her I did 1 refill w 1 extra then she will need to get any further rx's from her new md as I will not be able to fill after this due to intermediate

## 2022-04-04 ENCOUNTER — TELEPHONE (OUTPATIENT)
Dept: INTERNAL MEDICINE CLINIC | Age: 52
End: 2022-04-04

## 2022-04-04 DIAGNOSIS — F51.04 PSYCHOPHYSIOLOGICAL INSOMNIA: ICD-10-CM

## 2022-04-04 RX ORDER — LORAZEPAM 1 MG/1
TABLET ORAL
Qty: 30 TABLET | Refills: 0 | Status: SHIPPED | OUTPATIENT
Start: 2022-04-04 | End: 2022-04-13 | Stop reason: SDUPTHER

## 2022-04-04 NOTE — TELEPHONE ENCOUNTER
Patient needs a refill on:    LORazepam (ATIVAN) 1 MG tablet    [facility] #[id number] - [address] -  [phone]     She is down to one pill. She said she spoke to someone last Thursday and they said they would put in the request... I do not see anything out there. .. She has an appointment next week to see Dr. Mo Gonzales.       Please call and advise

## 2022-04-06 ENCOUNTER — TELEPHONE (OUTPATIENT)
Dept: ORTHOPEDIC SURGERY | Age: 52
End: 2022-04-06

## 2022-04-06 NOTE — TELEPHONE ENCOUNTER
PA submitted via [facility] for Butalbital-ASA-Caff-Codeine -57-30MG capsules. Key: [id number] - PA Case ID: [id number] - Rx #: [id number]    APPROVED today per [facility].   Coverage Start Date:04/06/2022  Coverage End Date:04/06/2023

## 2022-04-06 NOTE — TELEPHONE ENCOUNTER
[phone] (home) [phone] (work) PT INFORMED  She has new INS.  She will bring in  NEW  CARD next week will need to do repeat PA   For ([address])

## 2022-04-13 ENCOUNTER — OFFICE VISIT (OUTPATIENT)
Dept: INTERNAL MEDICINE CLINIC | Age: 52
End: 2022-04-13
Payer: COMMERCIAL

## 2022-04-13 VITALS — BODY MASS INDEX: 24.41 KG/M2 | WEIGHT: 143 LBS | HEIGHT: 64 IN | TEMPERATURE: 98.1 F

## 2022-04-13 DIAGNOSIS — M50.90 CERVICAL DISC DISEASE: ICD-10-CM

## 2022-04-13 DIAGNOSIS — F41.0 PANIC DISORDER: ICD-10-CM

## 2022-04-13 DIAGNOSIS — F32.A DEPRESSION, UNSPECIFIED DEPRESSION TYPE: ICD-10-CM

## 2022-04-13 DIAGNOSIS — G44.229 CHRONIC TENSION-TYPE HEADACHE, NOT INTRACTABLE: ICD-10-CM

## 2022-04-13 DIAGNOSIS — G43.719 INTRACTABLE CHRONIC MIGRAINE WITHOUT AURA AND WITHOUT STATUS MIGRAINOSUS: ICD-10-CM

## 2022-04-13 DIAGNOSIS — F51.04 PSYCHOPHYSIOLOGICAL INSOMNIA: ICD-10-CM

## 2022-04-13 PROCEDURE — 99214 OFFICE O/P EST MOD 30 MIN: CPT | Performed by: INTERNAL MEDICINE

## 2022-04-13 RX ORDER — ONDANSETRON 4 MG/1
4 TABLET, ORALLY DISINTEGRATING ORAL 3 TIMES DAILY PRN
Qty: 30 TABLET | Refills: 5 | Status: SHIPPED | OUTPATIENT
Start: 2022-04-13 | End: 2022-10-21

## 2022-04-13 RX ORDER — CODEINE/BUTALBITAL/ASA/CAFFEIN 30-50-325
1 CAPSULE ORAL EVERY 8 HOURS PRN
Qty: 30 CAPSULE | Refills: 2 | Status: SHIPPED | OUTPATIENT
Start: 2022-04-13 | End: 2022-06-08 | Stop reason: SDUPTHER

## 2022-04-13 RX ORDER — QUETIAPINE FUMARATE 100 MG/1
TABLET, FILM COATED ORAL
Qty: 90 TABLET | Refills: 11 | Status: SHIPPED | OUTPATIENT
Start: 2022-04-13 | End: 2022-06-08 | Stop reason: SDUPTHER

## 2022-04-13 RX ORDER — DULOXETIN HYDROCHLORIDE 30 MG/1
30 CAPSULE, DELAYED RELEASE ORAL DAILY
Qty: 30 CAPSULE | Refills: 5 | Status: SHIPPED | OUTPATIENT
Start: 2022-04-13 | End: 2022-06-08 | Stop reason: SDUPTHER

## 2022-04-13 RX ORDER — LORAZEPAM 1 MG/1
TABLET ORAL
Qty: 30 TABLET | Refills: 2 | Status: SHIPPED | OUTPATIENT
Start: 2022-04-13 | End: 2022-06-08 | Stop reason: SDUPTHER

## 2022-04-13 RX ORDER — BUPROPION HYDROCHLORIDE 150 MG/1
150 TABLET ORAL EVERY MORNING
Qty: 90 TABLET | Refills: 3 | Status: SHIPPED | OUTPATIENT
Start: 2022-04-13 | End: 2022-06-08 | Stop reason: SDUPTHER

## 2022-04-13 RX ORDER — KETOROLAC TROMETHAMINE 30 MG/ML
INJECTION, SOLUTION INTRAMUSCULAR; INTRAVENOUS
Qty: 4 ML | Refills: 5 | Status: SHIPPED | OUTPATIENT
Start: 2022-04-13

## 2022-04-13 RX ORDER — BUPROPION HYDROCHLORIDE 300 MG/1
300 TABLET ORAL EVERY MORNING
Qty: 90 TABLET | Refills: 3 | Status: SHIPPED | OUTPATIENT
Start: 2022-04-13 | End: 2022-06-08 | Stop reason: SDUPTHER

## 2022-04-13 ASSESSMENT — PATIENT HEALTH QUESTIONNAIRE - PHQ9
7. TROUBLE CONCENTRATING ON THINGS, SUCH AS READING THE NEWSPAPER OR WATCHING TELEVISION: 0
2. FEELING DOWN, DEPRESSED OR HOPELESS: 0
SUM OF ALL RESPONSES TO PHQ QUESTIONS 1-9: 0
8. MOVING OR SPEAKING SO SLOWLY THAT OTHER PEOPLE COULD HAVE NOTICED. OR THE OPPOSITE, BEING SO FIGETY OR RESTLESS THAT YOU HAVE BEEN MOVING AROUND A LOT MORE THAN USUAL: 0
SUM OF ALL RESPONSES TO PHQ QUESTIONS 1-9: 0
1. LITTLE INTEREST OR PLEASURE IN DOING THINGS: 0
1. LITTLE INTEREST OR PLEASURE IN DOING THINGS: 0
SUM OF ALL RESPONSES TO PHQ9 QUESTIONS 1 & 2: 0
SUM OF ALL RESPONSES TO PHQ QUESTIONS 1-9: 0
4. FEELING TIRED OR HAVING LITTLE ENERGY: 0
SUM OF ALL RESPONSES TO PHQ QUESTIONS 1-9: 0
SUM OF ALL RESPONSES TO PHQ9 QUESTIONS 1 & 2: 0
SUM OF ALL RESPONSES TO PHQ QUESTIONS 1-9: 0
6. FEELING BAD ABOUT YOURSELF - OR THAT YOU ARE A FAILURE OR HAVE LET YOURSELF OR YOUR FAMILY DOWN: 0
2. FEELING DOWN, DEPRESSED OR HOPELESS: 0
3. TROUBLE FALLING OR STAYING ASLEEP: 0
9. THOUGHTS THAT YOU WOULD BE BETTER OFF DEAD, OR OF HURTING YOURSELF: 0
SUM OF ALL RESPONSES TO PHQ QUESTIONS 1-9: 0
5. POOR APPETITE OR OVEREATING: 0

## 2022-04-13 ASSESSMENT — ENCOUNTER SYMPTOMS
BACK PAIN: 0
WHEEZING: 0
ABDOMINAL PAIN: 0
COLOR CHANGE: 0
CHEST TIGHTNESS: 0

## 2022-04-13 NOTE — PROGRESS NOTES
Subjective:      Patient ID: Melissa Mojica is a 46 y.o. female. Chief Complaint   Patient presents with    Medication Check     refills needed, colonoscopy needed     Headaches worse as unable to get botox recently due to insurance issues- neck pain is severe- still not sleeping well and needs ativan for sleep-taking fiorinal w codeine for the headaches even though she is aware of the rebound phenomena-also frustrated w weight gain-seems to be hungry all of the time- depression fairly well controlled w meds but w chronic pain having more issues     Review of Systems   Constitutional: Negative for activity change, appetite change and fatigue. HENT: Negative for congestion. Eyes: Negative for visual disturbance. Respiratory: Negative for chest tightness and wheezing. Cardiovascular: Negative for chest pain and palpitations. Gastrointestinal: Negative for abdominal pain. Musculoskeletal: Negative for arthralgias and back pain. Skin: Negative for color change and rash. Neurological: Positive for headaches. Negative for weakness and light-headedness. Psychiatric/Behavioral: Positive for dysphoric mood and sleep disturbance.        Family History   Problem Relation Age of Onset    Depression Father     Anxiety Disorder Father     Anxiety Disorder Mother      Social History     Socioeconomic History    Marital status:      Spouse name: Not on file    Number of children: Not on file    Years of education: Not on file    Highest education level: Not on file   Occupational History    Not on file   Tobacco Use    Smoking status: Never Smoker    Smokeless tobacco: Never Used   Vaping Use    Vaping Use: Never used   Substance and Sexual Activity    Alcohol use: Yes     Comment:  social    Drug use: No    Sexual activity: Not Currently   Other Topics Concern    Not on file   Social History Narrative    Not on file     Social Determinants of Health     Financial Resource Strain: Low Risk     Difficulty of Paying Living Expenses: Not hard at all   Food Insecurity: No Food Insecurity    Worried About Running Out of Food in the Last Year: Never true    Lobo of Food in the Last Year: Never true   Transportation Needs:     Lack of Transportation (Medical): Not on file    Lack of Transportation (Non-Medical): Not on file   Physical Activity:     Days of Exercise per Week: Not on file    Minutes of Exercise per Session: Not on file   Stress:     Feeling of Stress : Not on file   Social Connections:     Frequency of Communication with Friends and Family: Not on file    Frequency of Social Gatherings with Friends and Family: Not on file    Attends Latter-day Services: Not on file    Active Member of [address] [place] [address] [employer] or Organizations: Not on file    Attends Club or Organization Meetings: Not on file    Marital Status: Not on file   Intimate Partner Violence:     Fear of Current or Ex-Partner: Not on file    Emotionally Abused: Not on file    Physically Abused: Not on file    Sexually Abused: Not on file   Housing Stability:     Unable to Pay for Housing in the Last Year: Not on file    Number of [place] in the Last Year: Not on file    Unstable Housing in the Last Year: Not on file         Objective:   Physical Exam  Constitutional:       Appearance: She is well-developed. HENT:      Head: Normocephalic and atraumatic. Eyes:      Conjunctiva/sclera: Conjunctivae normal.      Pupils: Pupils are equal, round, and reactive to light. Cardiovascular:      Rate and Rhythm: Normal rate and regular rhythm. Heart sounds: Normal heart sounds. Pulmonary:      Effort: Pulmonary effort is normal. No respiratory distress. Breath sounds: Normal breath sounds. Abdominal:      General: There is no distension. Tenderness: There is no abdominal tenderness. Musculoskeletal:      Cervical back: Normal range of motion and neck supple.       Comments: Point tenderness of cervical spine with para spinal spasm. Decreased rom and pain w rom noted. Lymphadenopathy:      Cervical: No cervical adenopathy. Skin:     General: Skin is warm and dry. Neurological:      Mental Status: She is alert and oriented to person, place, and time. Psychiatric:         Mood and Affect: Mood normal.         Behavior: Behavior normal.         Thought Content: Thought content normal.         Judgment: Judgment normal.           Assessment and Plan:      Depression   Trial of cymbalta     Panic disorder   Doing well w current regimen    Cervical disc disease   Absolutely relates to her migraines- trial of cymbalta-? Consider seeing ortho for epidurals at some pt    Intractable chronic migraine without aura   Hopefully will get botox soon -try to keep fiorinal use at a minimum and pt aware of rebound phenomena and danger of addiction    Insomnia   Try to cut back on lorazepam       Encounter Diagnoses   Name Primary?  Psychophysiological insomnia     Chronic tension-type headache, not intractable     Intractable chronic migraine without aura and without status migrainosus     Depression, unspecified depression type     Panic disorder     Cervical disc disease        No orders of the defined types were placed in this encounter.

## 2022-04-13 NOTE — PATIENT INSTRUCTIONS
[facility]   Dr. Alysa Bustamante Dr. [address] [place] [address] to find a new MD  [phone]

## 2022-04-13 NOTE — ASSESSMENT & PLAN NOTE
Hopefully will get botox soon -try to keep fiorinal use at a minimum and pt aware of rebound phenomena and danger of addiction

## 2022-04-13 NOTE — ASSESSMENT & PLAN NOTE
Absolutely relates to her migraines- trial of cymbalta-?  Consider seeing ortho for epidurals at some pt

## 2022-04-18 ENCOUNTER — TELEPHONE (OUTPATIENT)
Dept: INTERNAL MEDICINE CLINIC | Age: 52
End: 2022-04-18

## 2022-04-18 NOTE — TELEPHONE ENCOUNTER
Pt says she is feeling really bad chest heaviness. Her heart is fluttering. High heart beat as if it is very rapid. She checked her pulse and her bp last night and they were abnormal for her. Today they are normal but she is still feeling the fluttering in her heart.        Please call and advise

## 2022-04-18 NOTE — TELEPHONE ENCOUNTER
MESSAGE SENT TO DR Arin Bernal DRLaury PATIENT WAS ADVISED TO GO TO ED SHE STATED SHE WOULD WAIT FOR APPT. NEXT WEEK WHEN DR. MEYERS RETURNS. 4/29/22

## 2022-04-29 ENCOUNTER — OFFICE VISIT (OUTPATIENT)
Dept: INTERNAL MEDICINE CLINIC | Age: 52
End: 2022-04-29
Payer: COMMERCIAL

## 2022-04-29 VITALS
HEIGHT: 64 IN | TEMPERATURE: 98 F | DIASTOLIC BLOOD PRESSURE: 80 MMHG | SYSTOLIC BLOOD PRESSURE: 122 MMHG | BODY MASS INDEX: 24.24 KG/M2 | WEIGHT: 142 LBS

## 2022-04-29 DIAGNOSIS — F51.04 PSYCHOPHYSIOLOGICAL INSOMNIA: ICD-10-CM

## 2022-04-29 DIAGNOSIS — R07.89 OTHER CHEST PAIN: ICD-10-CM

## 2022-04-29 DIAGNOSIS — F41.9 ANXIETY: ICD-10-CM

## 2022-04-29 DIAGNOSIS — F41.0 PANIC DISORDER: ICD-10-CM

## 2022-04-29 DIAGNOSIS — R00.2 PALPITATIONS: ICD-10-CM

## 2022-04-29 PROCEDURE — 99213 OFFICE O/P EST LOW 20 MIN: CPT | Performed by: INTERNAL MEDICINE

## 2022-04-29 ASSESSMENT — ENCOUNTER SYMPTOMS
WHEEZING: 0
ABDOMINAL PAIN: 0
COUGH: 0

## 2022-04-29 NOTE — PROGRESS NOTES
Subjective:      Patient ID: Josh Londono is a 46 y.o. female. Chief Complaint   Patient presents with    Palpitations     seen@ [address] [place] [address] heart racing, also stepped down  off bed  trauma to (l) ankle     Had evaluation at  for pac's- having heart racing and some chest pain-had r/o of stemi- is more stressed than usual- not drinking excessive etoh- chest pain is more pressure like-no pt tenderness-has upcoming trip to [place] for work and stressed about traveling-no recent panic attacks-not sleeping well due to stress     Review of Systems   Constitutional: Negative for activity change, fatigue and fever. Respiratory: Negative for cough and wheezing. Cardiovascular: Positive for chest pain and palpitations. Gastrointestinal: Negative for abdominal pain. Neurological: Negative for syncope and headaches. Psychiatric/Behavioral: Positive for sleep disturbance. The patient is nervous/anxious.         Family History   Problem Relation Age of Onset    Depression Father     Anxiety Disorder Father     Anxiety Disorder Mother      Social History     Socioeconomic History    Marital status:      Spouse name: Not on file    Number of children: Not on file    Years of education: Not on file    Highest education level: Not on file   Occupational History    Not on file   Tobacco Use    Smoking status: Never Smoker    Smokeless tobacco: Never Used   Vaping Use    Vaping Use: Never used   Substance and Sexual Activity    Alcohol use: Yes     Comment:  social    Drug use: No    Sexual activity: Not Currently   Other Topics Concern    Not on file   Social History Narrative    Not on file     Social Determinants of Health     Financial Resource Strain: Low Risk     Difficulty of Paying Living Expenses: Not hard at all   Food Insecurity: No Food Insecurity    Worried About [address] in the Last Year: Never true    [address] [facility] [address] in the Last Year: Never true Transportation Needs:     Lack of Transportation (Medical): Not on file    Lack of Transportation (Non-Medical): Not on file   Physical Activity:     Days of Exercise per Week: Not on file    Minutes of Exercise per Session: Not on file   Stress:     Feeling of Stress : Not on file   Social Connections:     Frequency of Communication with Friends and Family: Not on file    Frequency of Social Gatherings with Friends and Family: Not on file    Attends Buddhism Services: Not on file    Active Member of [address] [place] [address] [employer] or Organizations: Not on file    Attends Club or Organization Meetings: Not on file    Marital Status: Not on file   Intimate Partner Violence:     Fear of Current or Ex-Partner: Not on file    Emotionally Abused: Not on file    Physically Abused: Not on file    Sexually Abused: Not on file   Housing Stability:     Unable to Pay for Housing in the Last Year: Not on file    Number of [place] in the Last Year: Not on file    Unstable Housing in the Last Year: Not on file         Objective:   Physical Exam  Constitutional:       Appearance: She is well-developed. HENT:      Head: Normocephalic and atraumatic. Eyes:      Pupils: Pupils are equal, round, and reactive to light. Cardiovascular:      Rate and Rhythm: Normal rate and regular rhythm. Pulmonary:      Effort: Pulmonary effort is normal.      Breath sounds: Normal breath sounds. Skin:     General: Skin is warm and dry. Neurological:      Mental Status: She is alert and oriented to person, place, and time. Psychiatric:         Mood and Affect: Mood normal.         Behavior: Behavior normal.         Thought Content:  Thought content normal.         Judgment: Judgment normal.           Assessment and Plan:      Other chest pain   Ref to cardiology    Panic disorder   Seems worse w stress over an upcoming trip for work out of the country    Palpitations   Likely stress related-see cardiology     Anxiety   Work more on meditation and mindfulness     Insomnia   Worse w recent stress-needs to do more of her meditation and mindfulness     Records from [facility] ER visit reviewed. PAC's on ekg-troponin and other labs normal   Encounter Diagnoses   Name Primary?     Other chest pain     Panic disorder     Palpitations     Anxiety     Psychophysiological insomnia        Orders Placed This Encounter   Procedures   Ashley Fitzgerald MD, Cardiology, [facility]     Referral Priority:   Routine     Referral Type:   Eval and Treat     Referral Reason:   Specialty Services Required     Referred to Provider:   Litzy Neal MD     Requested Specialty:   Cardiology     Number of Visits Requested:   1

## 2022-05-05 ENCOUNTER — PROCEDURE VISIT (OUTPATIENT)
Dept: CARDIOLOGY CLINIC | Age: 52
End: 2022-05-05
Payer: COMMERCIAL

## 2022-05-05 ENCOUNTER — OFFICE VISIT (OUTPATIENT)
Dept: CARDIOLOGY CLINIC | Age: 52
End: 2022-05-05
Payer: COMMERCIAL

## 2022-05-05 ENCOUNTER — NURSE ONLY (OUTPATIENT)
Dept: CARDIOLOGY CLINIC | Age: 52
End: 2022-05-05

## 2022-05-05 VITALS
SYSTOLIC BLOOD PRESSURE: 118 MMHG | WEIGHT: 143 LBS | HEART RATE: 81 BPM | BODY MASS INDEX: 24.55 KG/M2 | DIASTOLIC BLOOD PRESSURE: 78 MMHG

## 2022-05-05 DIAGNOSIS — R07.9 CHEST PAIN, UNSPECIFIED TYPE: ICD-10-CM

## 2022-05-05 DIAGNOSIS — R00.2 PALPITATIONS: ICD-10-CM

## 2022-05-05 DIAGNOSIS — R00.2 PALPITATIONS: Primary | ICD-10-CM

## 2022-05-05 LAB
LV EF: 58 %
LVEF MODALITY: NORMAL

## 2022-05-05 PROCEDURE — 93242 EXT ECG>48HR<7D RECORDING: CPT | Performed by: INTERNAL MEDICINE

## 2022-05-05 PROCEDURE — 99244 OFF/OP CNSLTJ NEW/EST MOD 40: CPT | Performed by: INTERNAL MEDICINE

## 2022-05-05 PROCEDURE — 93000 ELECTROCARDIOGRAM COMPLETE: CPT | Performed by: INTERNAL MEDICINE

## 2022-05-05 PROCEDURE — 93356 MYOCRD STRAIN IMG SPCKL TRCK: CPT | Performed by: INTERNAL MEDICINE

## 2022-05-05 PROCEDURE — 93306 TTE W/DOPPLER COMPLETE: CPT | Performed by: INTERNAL MEDICINE

## 2022-05-05 RX ORDER — ASPIRIN 81 MG/1
81 TABLET ORAL DAILY
COMMUNITY

## 2022-05-05 NOTE — PROGRESS NOTES
Cc: palpitations    HPI:     Patient is a 57-year-old woman with history of migraines (currently on Botox and Fiorinal 1-3 times per week), \"right eye stroke\" (normal MRI brain ), palpitations. Patient started complaining of racing heartbeats the last month, worse over the last 2 weeks. The last couple of weeks he has also had chest discomfort with palpitations. Otherwise he is quite active at home and likes to exercise, does spinning 30 minutes 2 times per week and walks the dogs, without any symptoms. Patient was seen at [facility] emergency room on 2022 and her work-up was within normal limits. Her TSH was normal.    Patient reports constant symptoms on a daily basis. She only drinks 1 cup of coffee per day and avoids any caffeinated drinks. She does not have any exertional symptoms. Patient is scheduled to go on multiple trips in a couple of weeks and would like to have her cardiac work-up completed before that. ECG 2022: NSR, normal ECG. Histories     Past Medical History:   has a past medical history of Allergic rhinitis, cause unspecified, Anxiety state, unspecified, Cancer ([facility] [address] Depressive disorder, not elsewhere classified, Endometriosis, Ganglion cyst, HA (headache), Hemorrhage of rectum and anus, Insomnia, unspecified, Migraine, unspecified, without mention of intractable migraine without mention of status migrainosus, Screening mammogram, Sprain of neck, Unspecified constipation, and Unspecified thrombosed hemorrhoids. Surgical History:   has a past surgical history that includes Colonoscopy (2008); Nasal septum surgery (); laparoscopy (2005); and  section (). Social History:   reports that she has never smoked. She has never used smokeless tobacco. She reports current alcohol use. She reports that she does not use drugs.      Family History:  No evidence for sudden cardiac death or premature CAD      Medications:     Home medications were reviewed and are listed below    Prior to Admission medications    Medication Sig Start Date End Date Taking? Authorizing Provider   aspirin 81 MG EC tablet Take 81 mg by mouth daily   Yes Historical Provider, MD   LORazepam (ATIVAN) 1 MG tablet 1 qhs 4/13/22 7/13/22 Yes Yoel Lozano MD   butalbital-aspirin-caffeine-codeine [facility] WITH CODEINE) -91-30 MG capsule Take 1 capsule by mouth every 8 hours as needed for Pain for up to 60 days. 1 q8 prn headache-fill after 4/27/2022 4/13/22 6/12/22 Yes Yoel Lozano MD   buPROPion (WELLBUTRIN XL) 300 MG extended release tablet Take 1 tablet by mouth every morning 4/13/22  Yes Yoel Lozano MD   buPROPion (WELLBUTRIN XL) 150 MG extended release tablet Take 1 tablet by mouth every morning With the 300 mg tab 4/13/22  Yes Yoel Lozano MD   ketorolac (TORADOL) 30 MG/ML injection Inject 1 mL IM PRN for migraine, not to exceed 4mL in 1 month 4/13/22  Yes Yoel Lozano MD   NEEDLE, DISP, 25 G 25G X 1-1/4\" MISC Use for Toradol injection 4/13/22  Yes Yoel Lozano MD   ondansetron (ZOFRAN-ODT) 4 MG disintegrating tablet Take 1 tablet by mouth 3 times daily as needed for Nausea or Vomiting 4/13/22  Yes Yoel Lozano MD   QUEtiapine (SEROQUEL) 100 MG tablet TAKE 2 TO 3 TABLETS BY MOUTH EVERY NIGHT 4/13/22  Yes Yoel Lozano MD   DULoxetine (CYMBALTA) 30 MG extended release capsule Take 1 capsule by mouth daily 4/13/22  Yes Yoel Lozano MD   tiZANidine (ZANAFLEX) 4 MG tablet  7/5/21  Yes Historical Provider, MD   NURTEC 75 MG TBDP  9/7/21  Yes Historical Provider, MD   valACYclovir (VALTREX) 1 g tablet  3/9/21  Yes Historical Provider, MD   hydrocortisone (ANUSOL-HC) 2.5 % CREA rectal cream Apply up to tid 4/30/20  Yes Yoel Lozano MD   SYRINGE-NEEDLE, DISP, 3 ML (B-D 3CC LUER-MACIE SYR 25GX1\") 25G X 1\" 3 ML MISC USE FOR TORADOL INJECTION 4/1/19  Yes Yoel Lozano MD   montelukast (SINGULAIR) 10 MG tablet Take 10 mg by mouth nightly.    Yes Historical Provider, MD   azelastine (ASTELIN) 137 MCG/SPRAY nasal spray 1 spray by Nasal route 2 times daily. Use in each nostril as directed    Yes Historical Provider, MD   fluticasone (FLOVENT HFA) 44 MCG/ACT inhaler Inhale 1 puff into the lungs 2 times daily. Yes Historical Provider, MD   butalbital-aspirin-caffeine [facility]) -40 MG capsule TAKE 1 CAPSULE BY MOUTH EVERY 4 HOURS AS NEEDED FOR HEADACHE 11/3/21 12/3/21  Evelyne Bernstein MD          Allergy:     Penicillins       Review of Systems:     All 12 point review of symptoms completed. Pertinent positives identified in the HPI, all other review of symptoms negative as below. CONSTITUTIONAL: No fatigue  SKIN: No rash or pruritis. EYES: No visual changes or diplopia. No scleral icterus. ENT: No Headaches, hearing loss or vertigo. No mouth sores or sore throat. CARDIOVASCULAR: + chest pain/chest pressure/chest discomfort. + palpitations. No edema. RESPIRATORY: No dyspnea. No cough or wheezing, no sputum production. GASTROINTESTINAL: No N/V/D. No abdominal pain, appetite loss, blood in stools. GENITOURINARY: No dysuria, trouble voiding, or hematuria. MUSCULOSKELETAL:  No gait disturbance, weakness or joint complaints. NEUROLOGICAL: No headache, diplopia, change in muscle strength, numbness or tingling. No change in gait, balance, coordination, mood, affect, memory, mentation, behavior. PSHYCH: No anxiety, loss of interest, change in sexual behavior, feelings of self-harm, or confusion. ENDOCRINE: No excessive thirst, fluid intake, or urination. No tremor. HEMATOLOGIC: No abnormal bruising or bleeding. ALLERGY: No nasal congestion or hives.       Physical Examination:     Vitals:    05/05/22 1028   BP: 118/78   Pulse: 81   Weight: 143 lb (64.9 kg)       Wt Readings from Last 3 Encounters:   05/05/22 143 lb (64.9 kg)   04/29/22 142 lb (64.4 kg)   04/13/22 143 lb (64.9 kg)         General Appearance:  Alert, cooperative, no distress, appears stated age Appropriate weight   Head: Normocephalic, without obvious abnormality, atraumatic   Eyes:  PERRL, conjunctiva/corneas clear EOM intact  Ears normal   Throat no lesions       Nose: Nares normal, no drainage or sinus tenderness   Throat: Lips, mucosa, and tongue normal   Neck: Supple, symmetrical, trachea midline, no adenopathy, thyroid: not enlarged, symmetric, no tenderness/mass/nodules, no carotid bruit       Lungs:   Clear to auscultation bilaterally, respirations unlabored   Chest Wall:  No tenderness or deformity   Heart:  Regular rhythm, rate is controlled, S1, S2 normal, there is no murmur, there is no rub or gallop, cannot assess jvd, no bilateral lower extremity edema   Abdomen:   Soft, non-tender, bowel sounds active all four quadrants,  no masses, no organomegaly       Extremities: Extremities normal, atraumatic, no cyanosis   Pulses: 2+ and symmetric   Skin: Skin color, texture, turgor normal, no rashes or lesions   Pysch: Normal mood and affect   Neurologic: Normal gross motor and sensory exam.  Cranial nerves intact        Labs:     Lab Results   Component Value Date    WBC 10.0 06/15/2021    HGB 13.5 06/15/2021    HCT 40.6 06/15/2021    MCV 91.1 06/15/2021     06/15/2021     Lab Results   Component Value Date     06/15/2021    K 5.0 06/15/2021     06/15/2021    CO2 27 06/15/2021    BUN 12 06/15/2021    CREATININE 0.8 06/15/2021    GLUCOSE 93 06/15/2021    CALCIUM 10.0 06/15/2021    PROT 7.6 06/15/2021    LABALBU 5.2 (H) 06/15/2021    BILITOT 0.3 06/15/2021    ALKPHOS 87 06/15/2021    AST 21 06/15/2021    ALT 18 06/15/2021    LABGLOM >60 06/15/2021    GFRAA >60 06/15/2021    AGRATIO 2.2 06/15/2021    GLOB 2.4 06/15/2021         Lab Results   Component Value Date    CHOL 235 (H) 06/15/2021    CHOL 156 03/16/2012     Lab Results   Component Value Date    TRIG 94 06/15/2021    TRIG 62 03/16/2012     Lab Results   Component Value Date    HDL 70 (H) 06/15/2021    HDL 55 03/16/2012     Lab Results   Component Value Date    LDLCALC 146 (H) 06/15/2021    LDLCALC 89 03/16/2012     Lab Results   Component Value Date    LABVLDL 19 06/15/2021    LABVLDL 12 03/16/2012     No results found for: CHOLHDLRATIO    No results found for: INR, PROTIME    The 10-year ASCVD risk score (Moira Lorenz, et al., 2013) is: 1.3%    Values used to calculate the score:      Age: 46 years      Sex: Female      Is Non- : No      Diabetic: No      Tobacco smoker: No      Systolic Blood Pressure: 650 mmHg      Is BP treated: No      HDL Cholesterol: 70 mg/dL      Total Cholesterol: 235 mg/dL      Assessment / Plan:      Diagnosis Orders   1. Palpitations  EKG 12 Lead    ECHO Complete 2D W Doppler W Color   2. Chest pain, unspecified type  ECHO Complete 2D W Doppler W Color        1. Palpitations:  I cannot exclude tachyarrhythmias. - We will obtain a 48-hour Holter monitor and an echocardiogram  - Avoid caffeinated drinks    2. Atypical chest pains:  Patient is quite active at home and likes to exercise. She does not have any exertional symptoms. Her chest discomfort only happens during extreme tachycardia. She is currently a low risk patient for CAD    - Will monitor for now, consider stress test if she has exertional symptoms. Return in about 1 week (around 5/12/2022). I have spent 62 minutes of face to face time with the patient with more than 50% spent counseling and coordinating care. I have personally reviewed the reports and images of labs, radiological studies, cardiac studies including ECG's and telemetry, current and old medical records. The note was completed using EMR and Dragon dictation system. Every effort was made to ensure accuracy; however, inadvertent computerized transcription errors may be present. All questions and concerns were addressed to the patient/family. Alternatives to my treatment were discussed.      I would like to thank you for providing me the opportunity to participate in the care of your patient. If you have any questions, please do not hesitate to contact me.      Vince Dugan MD, [facility] - [address] Office Phone: [phone]  Fax: [phone]

## 2022-05-12 ENCOUNTER — OFFICE VISIT (OUTPATIENT)
Dept: CARDIOLOGY CLINIC | Age: 52
End: 2022-05-12
Payer: COMMERCIAL

## 2022-05-12 VITALS
HEART RATE: 105 BPM | SYSTOLIC BLOOD PRESSURE: 112 MMHG | DIASTOLIC BLOOD PRESSURE: 70 MMHG | WEIGHT: 141 LBS | BODY MASS INDEX: 24.2 KG/M2

## 2022-05-12 DIAGNOSIS — R00.2 PALPITATIONS: Primary | ICD-10-CM

## 2022-05-12 PROCEDURE — 93244 EXT ECG>48HR<7D REV&INTERPJ: CPT | Performed by: INTERNAL MEDICINE

## 2022-05-12 PROCEDURE — 99214 OFFICE O/P EST MOD 30 MIN: CPT | Performed by: INTERNAL MEDICINE

## 2022-05-12 NOTE — PROGRESS NOTES
Cc: palpitations    HPI:     Patient is a 57-year-old woman with history of migraines (currently on Botox and Fiorinal 1-3 times per week), \"right eye stroke\" (normal MRI brain 2016), palpitations, anxiety, depression.     Patient started complaining of racing heartbeats the last month, worse over the last 2 weeks. The last couple of weeks he has also had chest discomfort with palpitations. Otherwise he is quite active at home and likes to exercise, does spinning 30 minutes 2 times per week and walks the dogs, without any symptoms. Patient was seen at [facility] emergency room on 4/18/2022 and her work-up was within normal limits. Her TSH was normal.     Patient reports constant symptoms on a daily basis. She only drinks 1 cup of coffee per day and avoids any caffeinated drinks. She does not have any exertional symptoms. Patient is scheduled to go on multiple trips in a couple of weeks and would like to have her cardiac work-up completed before that.     ECG 5/5/2022: NSR, normal ECG. CAM 5/5 - 5/8/2022: Patient reports symptoms during the first day of wearing the monitor. There were no tachyarrhythmias or ectopies. Echo 5/5/2022: Normal    Patient is here for follow-up. She now drinks only 1 cup of coffee per day and uses Fiorinal for her migraines couple of times per week. Her BP at home 110-120/80-90 mmHg, HR 80-90 bpm.  She continues to have palpitations which are unrelated to activity. Histories     Past Medical History:   has a past medical history of Allergic rhinitis, cause unspecified, Anxiety state, unspecified, Cancer ([address] Depressive disorder, not elsewhere classified, Endometriosis, Ganglion cyst, HA (headache), Hemorrhage of rectum and anus, Insomnia, unspecified, Migraine, unspecified, without mention of intractable migraine without mention of status migrainosus, Screening mammogram, Sprain of neck, Unspecified constipation, and Unspecified thrombosed hemorrhoids.     Surgical History: has a past surgical history that includes Colonoscopy (2008); Nasal septum surgery (); laparoscopy (2005); and  section (). Social History:   reports that she has never smoked. She has never used smokeless tobacco. She reports current alcohol use. She reports that she does not use drugs. Family History:  No evidence for sudden cardiac death or premature CAD      Medications:     Home medications were reviewed and are listed below    Prior to Admission medications    Medication Sig Start Date End Date Taking? Authorizing Provider   aspirin 81 MG EC tablet Take 81 mg by mouth daily   Yes Historical Provider, MD   LORazepam (ATIVAN) 1 MG tablet 1 qhs 22 Yes Loree Bond MD   butalbital-aspirin-caffeine-codeine [facility] WITH CODEINE) -70-30 MG capsule Take 1 capsule by mouth every 8 hours as needed for Pain for up to 60 days.  1 q8 prn headache-fill after 2022 Yes Loree Bond MD   buPROPion (WELLBUTRIN XL) 300 MG extended release tablet Take 1 tablet by mouth every morning 22  Yes Loree Bond MD   buPROPion (WELLBUTRIN XL) 150 MG extended release tablet Take 1 tablet by mouth every morning With the 300 mg tab 22  Yes Loree Bond MD   ketorolac (TORADOL) 30 MG/ML injection Inject 1 mL IM PRN for migraine, not to exceed 4mL in 1 month 22  Yes Loree Bond MD   NEEDLE, DISP, 25 G 25G X 1-\" MISC Use for Toradol injection 22  Yes Loree Bodn MD   ondansetron (ZOFRAN-ODT) 4 MG disintegrating tablet Take 1 tablet by mouth 3 times daily as needed for Nausea or Vomiting 22  Yes Loree Bond MD   QUEtiapine (SEROQUEL) 100 MG tablet TAKE 2 TO 3 TABLETS BY MOUTH EVERY NIGHT 22  Yes Loree Bond MD   DULoxetine (CYMBALTA) 30 MG extended release capsule Take 1 capsule by mouth daily 22  Yes Loree Bond MD   tiZANidine (ZANAFLEX) 4 MG tablet  21  Yes Historical Provider, MD   NURTEC 75 MG TBDP  21  Yes Historical Provider, MD   valACYclovir (VALTREX) 1 g tablet  3/9/21  Yes Historical Provider, MD   hydrocortisone (ANUSOL-HC) 2.5 % CREA rectal cream Apply up to tid 4/30/20  Yes Godfrey Amor MD   SYRINGE-NEEDLE, DISP, 3 ML (B-D 3CC LUER-MACIE SYR 25GX1\") 25G X 1\" 3 ML MISC USE FOR TORADOL INJECTION 4/1/19  Yes Godfrey Amor MD   montelukast (SINGULAIR) 10 MG tablet Take 10 mg by mouth nightly. Yes Historical Provider, MD   azelastine (ASTELIN) 137 MCG/SPRAY nasal spray 1 spray by Nasal route 2 times daily. Use in each nostril as directed    Yes Historical Provider, MD   fluticasone (FLOVENT HFA) 44 MCG/ACT inhaler Inhale 1 puff into the lungs 2 times daily. Yes Historical Provider, MD   butalbital-aspirin-caffeine Noriega Rust) -40 MG capsule TAKE 1 CAPSULE BY MOUTH EVERY 4 HOURS AS NEEDED FOR HEADACHE 11/3/21 12/3/21  Godfrey Amor MD          Allergy:     Penicillins       Review of Systems:     All 12 point review of symptoms completed. Pertinent positives identified in the HPI, all other review of symptoms negative as below. CONSTITUTIONAL: No fatigue  SKIN: No rash or pruritis. EYES: No visual changes or diplopia. No scleral icterus. ENT: No Headaches, hearing loss or vertigo. No mouth sores or sore throat. CARDIOVASCULAR: No chest pain/chest pressure/chest discomfort. + palpitations. No edema. RESPIRATORY: No dyspnea. No cough or wheezing, no sputum production. GASTROINTESTINAL: No N/V/D. No abdominal pain, appetite loss, blood in stools. GENITOURINARY: No dysuria, trouble voiding, or hematuria. MUSCULOSKELETAL:  No gait disturbance, weakness or joint complaints. NEUROLOGICAL: No headache, diplopia, change in muscle strength, numbness or tingling. No change in gait, balance, coordination, mood, affect, memory, mentation, behavior. PSHYCH: No anxiety, loss of interest, change in sexual behavior, feelings of self-harm, or confusion. ENDOCRINE: No excessive thirst, fluid intake, or urination. No tremor. HEMATOLOGIC: No abnormal bruising or bleeding. ALLERGY: No nasal congestion or hives.       Physical Examination:     Vitals:    05/12/22 0857   BP: 112/70   Pulse: 105   Weight: 141 lb (64 kg)       Wt Readings from Last 3 Encounters:   05/12/22 141 lb (64 kg)   05/05/22 143 lb (64.9 kg)   04/29/22 142 lb (64.4 kg)         General Appearance:  Alert, cooperative, no distress, appears stated age Appropriate weight   Head:  Normocephalic, without obvious abnormality, atraumatic   Eyes:  PERRL, conjunctiva/corneas clear EOM intact  Ears normal   Throat no lesions       Nose: Nares normal, no drainage or sinus tenderness   Throat: Lips, mucosa, and tongue normal   Neck: Supple, symmetrical, trachea midline, no adenopathy, thyroid: not enlarged, symmetric, no tenderness/mass/nodules, no carotid bruit       Lungs:   Clear to auscultation bilaterally, respirations unlabored   Chest Wall:  No tenderness or deformity   Heart:  Regular rhythm, rate is controlled, S1, S2 normal, there is no murmur, there is no rub or gallop, cannot assess jvd, no bilateral lower extremity edema   Abdomen:   Soft, non-tender, bowel sounds active all four quadrants,  no masses, no organomegaly       Extremities: Extremities normal, atraumatic, no cyanosis   Pulses: 2+ and symmetric   Skin: Skin color, texture, turgor normal, no rashes or lesions   Pysch: Normal mood and affect   Neurologic: Normal gross motor and sensory exam.  Cranial nerves intact        Labs:     Lab Results   Component Value Date    WBC 10.0 06/15/2021    HGB 13.5 06/15/2021    HCT 40.6 06/15/2021    MCV 91.1 06/15/2021     06/15/2021     Lab Results   Component Value Date     06/15/2021    K 5.0 06/15/2021     06/15/2021    CO2 27 06/15/2021    BUN 12 06/15/2021    CREATININE 0.8 06/15/2021    GLUCOSE 93 06/15/2021    CALCIUM 10.0 06/15/2021    PROT 7.6 06/15/2021    LABALBU 5.2 (H) 06/15/2021    BILITOT 0.3 06/15/2021    ALKPHOS 87 06/15/2021    AST 21 06/15/2021    ALT 18 06/15/2021    LABGLOM >60 06/15/2021    GFRAA >60 06/15/2021    AGRATIO 2.2 06/15/2021    GLOB 2.4 06/15/2021         Lab Results   Component Value Date    CHOL 235 (H) 06/15/2021    CHOL 156 03/16/2012     Lab Results   Component Value Date    TRIG 94 06/15/2021    TRIG 62 03/16/2012     Lab Results   Component Value Date    HDL 70 (H) 06/15/2021    HDL 55 03/16/2012     Lab Results   Component Value Date    LDLCALC 146 (H) 06/15/2021    LDLCALC 89 03/16/2012     Lab Results   Component Value Date    LABVLDL 19 06/15/2021    LABVLDL 12 03/16/2012     No results found for: CHOLHDLRATIO    No results found for: INR, PROTIME    The 10-year ASCVD risk score (Alexis Tabares, et al., 2013) is: 1.2%    Values used to calculate the score:      Age: 46 years      Sex: Female      Is Non- : No      Diabetic: No      Tobacco smoker: No      Systolic Blood Pressure: 660 mmHg      Is BP treated: No      HDL Cholesterol: 70 mg/dL      Total Cholesterol: 235 mg/dL      Assessment / Plan:      Diagnosis Orders   1. Palpitations          1. Palpitations:  Tachyarrhythmias and/or ectopic beats have been excluded by a 3-day monitor during which patient had a day follow-up symptoms. I suspect her symptoms are due to sinus tachycardia which could be induced by anxiety.     - Avoid caffeinated drinks  - I referred patient to her PCP for any antianxiety medications.     2. Atypical chest pains:  Patient is quite active at home and likes to exercise. She does not have any exertional symptoms. Her chest discomfort only happens during extreme tachycardia. She is currently a low risk patient for CAD. She has no complaint of chest pain this time.     - Will monitor for now, consider stress test if she has exertional symptoms. Return in about 1 year (around 5/12/2023).       I have spent 35 minutes of face to face time with the patient with more than 50% spent counseling and coordinating care. I have personally reviewed the reports and images of labs, radiological studies, cardiac studies including ECG's and telemetry, current and old medical records. The note was completed using EMR and Dragon dictation system. Every effort was made to ensure accuracy; however, inadvertent computerized transcription errors may be present. All questions and concerns were addressed to the patient/family. Alternatives to my treatment were discussed. I would like to thank you for providing me the opportunity to participate in the care of your patient. If you have any questions, please do not hesitate to contact me.      Jeremiah Isaac MD, [address] Office Phone: [phone]  Fax: [phone]

## 2022-05-13 DIAGNOSIS — R00.2 PALPITATIONS: ICD-10-CM

## 2022-06-08 DIAGNOSIS — F51.04 PSYCHOPHYSIOLOGICAL INSOMNIA: ICD-10-CM

## 2022-06-08 DIAGNOSIS — G44.229 CHRONIC TENSION-TYPE HEADACHE, NOT INTRACTABLE: ICD-10-CM

## 2022-06-08 RX ORDER — CODEINE/BUTALBITAL/ASA/CAFFEIN 30-50-325
1 CAPSULE ORAL EVERY 8 HOURS PRN
Qty: 30 CAPSULE | Refills: 0 | Status: SHIPPED | OUTPATIENT
Start: 2022-06-08 | End: 2022-08-07

## 2022-06-08 RX ORDER — DULOXETIN HYDROCHLORIDE 30 MG/1
30 CAPSULE, DELAYED RELEASE ORAL DAILY
Qty: 30 CAPSULE | Refills: 0 | Status: SHIPPED | OUTPATIENT
Start: 2022-06-08 | End: 2022-10-26

## 2022-06-08 RX ORDER — LORAZEPAM 1 MG/1
TABLET ORAL
Qty: 30 TABLET | Refills: 0 | Status: SHIPPED | OUTPATIENT
Start: 2022-06-08 | End: 2022-09-07

## 2022-06-08 RX ORDER — QUETIAPINE FUMARATE 100 MG/1
TABLET, FILM COATED ORAL
Qty: 90 TABLET | Refills: 0 | Status: SHIPPED | OUTPATIENT
Start: 2022-06-08

## 2022-06-08 RX ORDER — BUPROPION HYDROCHLORIDE 150 MG/1
150 TABLET ORAL EVERY MORNING
Qty: 90 TABLET | Refills: 0 | Status: SHIPPED | OUTPATIENT
Start: 2022-06-08

## 2022-06-08 RX ORDER — BUPROPION HYDROCHLORIDE 300 MG/1
300 TABLET ORAL EVERY MORNING
Qty: 90 TABLET | Refills: 0 | Status: SHIPPED | OUTPATIENT
Start: 2022-06-08

## 2022-06-08 NOTE — TELEPHONE ENCOUNTER
----- Message from Safia Banda sent at 6/8/2022 12:48 PM EDT -----  Subject: Refill Request    QUESTIONS  Name of Medication? butalbital-aspirin-caffeine-codeine (FIORINAL WITH   CODEINE) -84-30 MG capsule  Patient-reported dosage and instructions? as needed for headaches  How many days do you have left? Unknown  Preferred Pharmacy? [facility] #[id number]  Pharmacy phone number (if available)? [phone]  Additional Information for Provider? Patient wants to ensure she can get   refills through this office to cover her until her appt 8/24 with Dr. Arnel Mireles. Please contact her to advise.  ---------------------------------------------------------------------------  --------------,  Name of Medication? buPROPion (WELLBUTRIN XL) 150 MG extended release   tablet  Patient-reported dosage and instructions? 150 mg, once per day  How many days do you have left? Unknown  Preferred Pharmacy? [facility] #[id number]  Pharmacy phone number (if available)? [phone]  ---------------------------------------------------------------------------  --------------,  Name of Medication? buPROPion (WELLBUTRIN XL) 300 MG extended release   tablet  Patient-reported dosage and instructions? 300 mg, once daily  How many days do you have left? Unknown  Preferred Pharmacy? [facility] #[id number]  Pharmacy phone number (if available)? [phone]  ---------------------------------------------------------------------------  --------------,  Name of Medication? DULoxetine (CYMBALTA) 30 MG extended release capsule  Patient-reported dosage and instructions? 30 mg, once daily  How many days do you have left? Unknown  Preferred Pharmacy? [facility] #[id number]  Pharmacy phone number (if available)? [phone]  ---------------------------------------------------------------------------  --------------,  Name of Medication? QUEtiapine (SEROQUEL) 100 MG tablet  Patient-reported dosage and instructions?  100 mg, \"2-3\" but pt only takes   2 per day  How many days do you have left? Unknown  Preferred Pharmacy? [facility] #[id number]  Pharmacy phone number (if available)? [phone]  ---------------------------------------------------------------------------  --------------,  Name of Medication? LORazepam (ATIVAN) 1 MG tablet  Patient-reported dosage and instructions? 1 mg, once daily at bedtime  How many days do you have left? Unknown  Preferred Pharmacy? [facility] #[id number]  Pharmacy phone number (if available)? [phone]  ---------------------------------------------------------------------------  --------------  Afai Edith INFO  What is the best way for the office to contact you? OK to leave message on   voicemail, OK to respond with electronic message via Mlog portal (only   for patients who have registered [employer] account)  Preferred Call Back Phone Number? [phone]  ---------------------------------------------------------------------------  --------------  SCRIPT ANSWERS  Relationship to Patient?  Self

## 2022-08-11 DIAGNOSIS — F51.04 PSYCHOPHYSIOLOGICAL INSOMNIA: ICD-10-CM

## 2022-08-12 DIAGNOSIS — F51.04 PSYCHOPHYSIOLOGICAL INSOMNIA: ICD-10-CM

## 2022-08-12 RX ORDER — LORAZEPAM 1 MG/1
TABLET ORAL
Qty: 30 TABLET | OUTPATIENT
Start: 2022-08-12

## 2022-08-15 RX ORDER — LORAZEPAM 1 MG/1
TABLET ORAL
Qty: 90 TABLET | Refills: 1 | Status: CANCELLED | OUTPATIENT
Start: 2022-08-15 | End: 2022-11-11

## 2022-08-15 NOTE — TELEPHONE ENCOUNTER
Pt received 30 pills 2 months ago, which means she had been out of pills for a month, I do not prescribe bnz long term and have not met pt yet. Not appropriate for automatic refill. I will discuss this with pt when we meet next week.

## 2022-08-24 ENCOUNTER — OFFICE VISIT (OUTPATIENT)
Dept: INTERNAL MEDICINE CLINIC | Age: 52
End: 2022-08-24
Payer: COMMERCIAL

## 2022-08-24 VITALS
BODY MASS INDEX: 24.41 KG/M2 | DIASTOLIC BLOOD PRESSURE: 70 MMHG | HEART RATE: 84 BPM | SYSTOLIC BLOOD PRESSURE: 110 MMHG | OXYGEN SATURATION: 98 % | WEIGHT: 143 LBS | TEMPERATURE: 98.3 F | HEIGHT: 64 IN

## 2022-08-24 DIAGNOSIS — G43.719 INTRACTABLE CHRONIC MIGRAINE WITHOUT AURA AND WITHOUT STATUS MIGRAINOSUS: ICD-10-CM

## 2022-08-24 DIAGNOSIS — Z12.11 COLON CANCER SCREENING: ICD-10-CM

## 2022-08-24 DIAGNOSIS — F41.9 ANXIETY: Primary | ICD-10-CM

## 2022-08-24 DIAGNOSIS — F51.04 PSYCHOPHYSIOLOGICAL INSOMNIA: ICD-10-CM

## 2022-08-24 PROCEDURE — 99215 OFFICE O/P EST HI 40 MIN: CPT | Performed by: INTERNAL MEDICINE

## 2022-08-24 SDOH — ECONOMIC STABILITY: FOOD INSECURITY: WITHIN THE PAST 12 MONTHS, THE FOOD YOU BOUGHT JUST DIDN'T LAST AND YOU DIDN'T HAVE MONEY TO GET MORE.: NEVER TRUE

## 2022-08-24 SDOH — HEALTH STABILITY: PHYSICAL HEALTH: ON AVERAGE, HOW MANY MINUTES DO YOU ENGAGE IN EXERCISE AT THIS LEVEL?: 30 MIN

## 2022-08-24 SDOH — ECONOMIC STABILITY: FOOD INSECURITY: WITHIN THE PAST 12 MONTHS, YOU WORRIED THAT YOUR FOOD WOULD RUN OUT BEFORE YOU GOT MONEY TO BUY MORE.: NEVER TRUE

## 2022-08-24 SDOH — HEALTH STABILITY: PHYSICAL HEALTH: ON AVERAGE, HOW MANY DAYS PER WEEK DO YOU ENGAGE IN MODERATE TO STRENUOUS EXERCISE (LIKE A BRISK WALK)?: 3 DAYS

## 2022-08-24 ASSESSMENT — SOCIAL DETERMINANTS OF HEALTH (SDOH)
WITHIN THE LAST YEAR, HAVE YOU BEEN AFRAID OF YOUR PARTNER OR EX-PARTNER?: NO
WITHIN THE LAST YEAR, HAVE TO BEEN RAPED OR FORCED TO HAVE ANY KIND OF SEXUAL ACTIVITY BY YOUR PARTNER OR EX-PARTNER?: NO
WITHIN THE LAST YEAR, HAVE YOU BEEN KICKED, HIT, SLAPPED, OR OTHERWISE PHYSICALLY HURT BY YOUR PARTNER OR EX-PARTNER?: NO
HOW HARD IS IT FOR YOU TO PAY FOR THE VERY BASICS LIKE FOOD, HOUSING, MEDICAL CARE, AND HEATING?: NOT HARD AT ALL
WITHIN THE LAST YEAR, HAVE YOU BEEN HUMILIATED OR EMOTIONALLY ABUSED IN OTHER WAYS BY YOUR PARTNER OR EX-PARTNER?: NO

## 2022-08-24 ASSESSMENT — ENCOUNTER SYMPTOMS: SHORTNESS OF BREATH: 0

## 2022-08-24 NOTE — ASSESSMENT & PLAN NOTE
-had been using ativan 1 mg, Seroquel 200 mg and melatonin 10 mg for sleep nightly. Been on bnz for about 9 years now (since her mother passed). -I explained I do not prescribe benzodiazepines as long-term sleep treatment. While benzodiazepines are highly effective in the short term, adverse effects associated with long-term use including impaired cognitive abilities, memory problems, sedation, increased risk of falls, mood swings, and overdoses may make the risk-benefit ratio unfavorable. This is especially true for older patients. And especially concerning given the other multiple sedating medications she is taking regularly. We strongly advise that benzodiazepines should not be used long term unless medically necessary (e.g. Spastic paralysis, uncontrolled anxiety as deemed by a psychiatrist while seeking their care). In addition, benzodiazepines have reinforcing properties and thus are considered to be addictive drugs, leading to a physical dependence within weeks of use. Many of theses adverse events can be prevented once one is weaned off benzodiazepines and placed on a better regiment   -we will start to wean down. Lower to 0.5mg nightly for 2 weeks, then 0.25mg nightly then stop. She has ?~10 1 mg pills left. She will call the office to let me know exactly how much she has left so we can make sure she is safely weaned off. I added her to my schedule 09/15 3:30 for close f/u.    -I explained first-line treatment for insomnia is CBT-I, can try Dr. Brice Kirk or other therapist who might be working with her insurance  -I will continue Seroquel 200 mg for now  -Lower melatonin to 1 mg from currently 10 mg as studies show better outcome with lower doses

## 2022-08-24 NOTE — ASSESSMENT & PLAN NOTE
-sees [address] [place] [address] [address] Neurology [address]. Serena Hernandez, is getting botox inj q3 months but missed last dose due to insurance change.   -gets MRI q5y (mother had brain tumor glioblastoma, sister had brain aneurysm  -does toradol inj prn (~6/m)  -uses nurtac prn (not helpful)  -Tried Imitrex, rizatriptan, eletriptan, zolmitriptan, Ubrelvy, amitriptyline and gabapentin in the past per neurology note.   Per patient tried Ajovy injections

## 2022-08-24 NOTE — PROGRESS NOTES
[facility] Internal Medicine  Establish care visit   2022    Jaden Garcia (:  1970) bibi 46 y.o. female, here to establish care. Chief Complaint   Patient presents with    New Patient    Discuss Medications    Referral - General     colonoscopy        Patient Active Problem List   Diagnosis    Depression    Allergic rhinitis    Insomnia    Panic disorder    Anxiety    Intractable chronic migraine without aura    Retinal disease, right    Cervical disc disease    At risk for osteopenia due to history of osteoporosis    Palpitations       ASSESSMENT/ PLAN:    Anxiety  Overall well controlled  -continue Wellbutrin 450 mg and Cymbalta 30 mg    Intractable chronic migraine without aura  -sees [address] [place] [address] [address] Neurology Dr. Luis Miguel Monte, is getting botox inj q3 months but missed last dose due to insurance change.   -gets MRI q5y (mother had brain tumor glioblastoma, sister had brain aneurysm  -does toradol inj prn (~6/m)  -uses nurtac prn (not helpful)  -Tried Imitrex, rizatriptan, eletriptan, zolmitriptan, Ubrelvy, amitriptyline and gabapentin in the past per neurology note. Per patient tried Ajovy injections    Allergic rhinitis  -uses Singulair, nasal Flovent  -hx of allergy shots     Insomnia  -had been using ativan 1 mg, Seroquel 200 mg and melatonin 10 mg for sleep nightly. Been on bnz for about 9 years now (since her mother passed). -I explained I do not prescribe benzodiazepines as long-term sleep treatment. While benzodiazepines are highly effective in the short term, adverse effects associated with long-term use including impaired cognitive abilities, memory problems, sedation, increased risk of falls, mood swings, and overdoses may make the risk-benefit ratio unfavorable. This is especially true for older patients. And especially concerning given the other multiple sedating medications she is taking regularly.  We strongly advise that benzodiazepines should not be used long term unless medically necessary (e.g. Spastic paralysis, uncontrolled anxiety as deemed by a psychiatrist while seeking their care). In addition, benzodiazepines have reinforcing properties and thus are considered to be addictive drugs, leading to a physical dependence within weeks of use. Many of theses adverse events can be prevented once one is weaned off benzodiazepines and placed on a better regiment   -we will start to wean down. Lower to 0.5mg nightly for 2 weeks, then 0.25mg nightly then stop. She has ?~10 1 mg pills left. She will call the office to let me know exactly how much she has left so we can make sure she is safely weaned off. I added her to my schedule 09/15 3:30 for close f/u. -I explained first-line treatment for insomnia is CBT-I, can try Dr. Thomas Orlando or other therapist who might be working with her insurance  -I will continue Seroquel 200 mg for now  -Lower melatonin to 1 mg from currently 10 mg as studies show better outcome with lower doses      Orders Placed This Encounter   Procedures    Felipe Reynoso MD, Gastroenterology, [facility]     No orders of the defined types were placed in this encounter. Medications Discontinued During This Encounter   Medication Reason    azelastine (ASTELIN) 137 MCG/SPRAY nasal spray Therapy completed        No follow-ups on file. HPI  Sleep problems since mother passed 9 years ago. She takes lorazepam 1 mg, and Seroquel 200mg. Sleeps 5-7 hours. Takes 2 hours to fall a sleep. Takes meds around 7. Gets up 5:20 to get ready for work. Does meditation, mindfulness.      Lorazepam 7/1/ 30 7/19, 30 pills, has 10-15 left  Should have enogh until   Will cut 0.5 mg nightly for 2 weeks,       HISTORIES  Current Outpatient Medications on File Prior to Visit   Medication Sig Dispense Refill    buPROPion (WELLBUTRIN XL) 300 MG extended release tablet Take 1 tablet by mouth every morning 90 tablet 0    buPROPion (WELLBUTRIN XL) 150 MG extended release tablet Take 1 tablet by mouth every morning With the 300 mg tab 90 tablet 0    QUEtiapine (SEROQUEL) 100 MG tablet TAKE 2 TO 3 TABLETS BY MOUTH EVERY NIGHT 90 tablet 0    DULoxetine (CYMBALTA) 30 MG extended release capsule Take 1 capsule by mouth daily 30 capsule 0    LORazepam (ATIVAN) 1 MG tablet 1 tablet orally qhs as needed watch drowsiness and fall risk 30 tablet 0    aspirin 81 MG EC tablet Take 81 mg by mouth daily      ketorolac (TORADOL) 30 MG/ML injection Inject 1 mL IM PRN for migraine, not to exceed 4mL in 1 month 4 mL 5    NEEDLE, DISP, 25 G 25G X 1-1/4\" MISC Use for Toradol injection 4 each 0    ondansetron (ZOFRAN-ODT) 4 MG disintegrating tablet Take 1 tablet by mouth 3 times daily as needed for Nausea or Vomiting 30 tablet 5    tiZANidine (ZANAFLEX) 4 MG tablet       NURTEC 75 MG TBDP       valACYclovir (VALTREX) 1 g tablet       SYRINGE-NEEDLE, DISP, 3 ML (B-D 3CC LUER-MACIE SYR 25GX1\") 25G X 1\" 3 ML MISC USE FOR TORADOL INJECTION 100 each 0    montelukast (SINGULAIR) 10 MG tablet Take 10 mg by mouth nightly. fluticasone (FLOVENT HFA) 44 MCG/ACT inhaler Inhale 1 puff into the lungs 2 times daily. butalbital-aspirin-caffeine (FIORINAL) -40 MG capsule TAKE 1 CAPSULE BY MOUTH EVERY 4 HOURS AS NEEDED FOR HEADACHE 30 capsule 1    hydrocortisone (ANUSOL-HC) 2.5 % CREA rectal cream Apply up to tid (Patient not taking: Reported on 8/24/2022) 1 Tube 1     No current facility-administered medications on file prior to visit.         Allergies   Allergen Reactions    Penicillins      Past Medical History:   Diagnosis Date    Allergic rhinitis, cause unspecified     Chronic    Anxiety state, unspecified     Cancer ([facility] [address]     Depressive disorder, not elsewhere classified     Endometriosis 2005    Ganglion cyst 2009    HA (headache)     Hemorrhage of rectum and anus     Insomnia, unspecified     Migraine, unspecified, without mention of intractable migraine without mention of status migrainosus     Chronic Screening mammogram 2009    Benign    Sprain of neck     Bilateral    Unspecified constipation     Unspecified thrombosed hemorrhoids      Patient Active Problem List   Diagnosis    Depression    Allergic rhinitis    Insomnia    Panic disorder    Anxiety    Intractable chronic migraine without aura    Retinal disease, right    Cervical disc disease    At risk for osteopenia due to history of osteoporosis    Palpitations     Past Surgical History:   Procedure Laterality Date     SECTION      COLONOSCOPY  2008    LAPAROSCOPY  2005    Endometrial    NASAL SEPTUM SURGERY  1993    Reconstruction     Social History     Socioeconomic History    Marital status:      Spouse name: Not on file    Number of children: Not on file    Years of education: Not on file    Highest education level: Not on file   Occupational History    Not on file   Tobacco Use    Smoking status: Never    Smokeless tobacco: Never   Vaping Use    Vaping Use: Never used   Substance and Sexual Activity    Alcohol use: Yes     Comment:  social    Drug use: No    Sexual activity: Not Currently   Other Topics Concern    Not on file   Social History Narrative    Not on file     Social Determinants of Health     Financial Resource Strain: Low Risk     Difficulty of Paying Living Expenses: Not hard at all   Food Insecurity: No Food Insecurity    Worried About Running Out of Food in the Last Year: Never true    Loob of Food in the Last Year: Never true   Transportation Needs: Not on file   Physical Activity: Insufficiently Active    Days of Exercise per Week: 3 days    Minutes of Exercise per Session: 30 min   Stress: Not on file   Social Connections: Not on file   Intimate Partner Violence: Not At Risk    Fear of Current or Ex-Partner: No    Emotionally Abused: No    Physically Abused: No    Sexually Abused: No   Housing Stability: Not on file      Family History   Problem Relation Age of Onset    Depression Father Anxiety Disorder Father     Anxiety Disorder Mother          ROS  Review of Systems   Respiratory:  Negative for shortness of breath. Cardiovascular:  Negative for chest pain. Psychiatric/Behavioral:  Positive for sleep disturbance. Negative for dysphoric mood. The patient is not nervous/anxious. PE  Vitals:    08/24/22 1054   BP: 110/70   Site: Left Upper Arm   Position: Sitting   Pulse: 84   Temp: 98.3 °F (36.8 °C)   SpO2: 98%   Weight: 143 lb (64.9 kg)   Height: 5' 4\" (1.626 m)     Estimated body mass index is 24.55 kg/m² as calculated from the following:    Height as of this encounter: 5' 4\" (1.626 m). Weight as of this encounter: 143 lb (64.9 kg). Physical Exam  Vitals reviewed. Constitutional:       General: She is not in acute distress. Appearance: Normal appearance. She is well-developed. She is not ill-appearing, toxic-appearing or diaphoretic. HENT:      Head: Normocephalic and atraumatic. Eyes:      General: No scleral icterus. Conjunctiva/sclera: Conjunctivae normal.      Pupils: Pupils are equal, round, and reactive to light. Cardiovascular:      Rate and Rhythm: Normal rate and regular rhythm. Heart sounds: Normal heart sounds. Pulmonary:      Effort: Pulmonary effort is normal. No respiratory distress. Breath sounds: Normal breath sounds. Musculoskeletal:      Cervical back: Normal range of motion and neck supple. Right lower leg: No edema. Left lower leg: No edema. Skin:     General: Skin is warm and dry. Coloration: Skin is not jaundiced. Neurological:      General: No focal deficit present. Mental Status: She is alert and oriented to person, place, and time. Cranial Nerves: No cranial nerve deficit. Motor: No weakness.       Gait: Gait normal.   Psychiatric:         Behavior: Behavior normal.       Immunization History   Administered Date(s) Administered    COVID-19, PFIZER PURPLE top, DILUTE for use, (age 15 y+), 30mcg/0.3mL 03/25/2021, 04/22/2021, 01/08/2022    DTaP 01/11/2006    DTaP (Infanrix) 01/11/2006    Hepatitis A 10/27/2015    Influenza Virus Vaccine 10/03/2019, 10/17/2020, 09/28/2021    Tdap (Boostrix, Adacel) 05/31/2017       Health Maintenance   Topic Date Due    Cervical cancer screen  Never done    Colorectal Cancer Screen  04/16/2022    COVID-19 Vaccine (4 - Booster for Pfizer series) 05/08/2022    Flu vaccine (1) 09/01/2022    Depression Monitoring  04/13/2023    Breast cancer screen  04/07/2024    Lipids  06/15/2026    DTaP/Tdap/Td vaccine (3 - Td or Tdap) 05/31/2027    Shingles vaccine  Completed    Hepatitis C screen  Completed    HIV screen  Completed    Hepatitis A vaccine  Aged Out    Hepatitis B vaccine  Aged Out    Hib vaccine  Aged Out    Meningococcal (ACWY) vaccine  Aged Out    Pneumococcal 0-64 years Vaccine  Aged Out       PSH, PMH, SH and FH reviewed and noted. Recent and past labs, tests and consults also reviewed. Recent or new meds also reviewed. Estefany Hunter MD    I spent over 60 mins reviewing patient chart, examining, evaluating, discussing patient's conditions, counseling and educating patient. This dictation was generated by voice recognition computer software. Although all attempts are made to edit the dictation for accuracy, there may be errors in the transcription that are not intended.

## 2022-08-24 NOTE — PATIENT INSTRUCTIONS
While benzodiazepines are highly effective in the short term, adverse effects associated with long-term use including impaired cognitive abilities, memory problems, sedation, increased risk of falls, mood swings, and overdoses may make the risk-benefit ratio unfavorable. We strongly advise that benzodiazepines should not be used long term unless medically necessary (e.g. Spastic paralysis, uncontrolled anxiety as deemed by a psychiatrist while seeking their care). In addition, benzodiazepines have reinforcing properties and thus are considered to be addictive drugs, leading to a physical dependence within weeks of use. Many of theses adverse events can be prevented once one is weaned off benzodiazepines and placed on a better regiment.      For sleep:  -Sleep as long as necessary to feel rested (usually seven to eight hours for adults) and then get out of bed  -Maintain a regular sleep schedule, particularly a regular wake-up time in the morning  -Try not to force sleep  -Avoid caffeinated beverages after lunch  -Avoid alcohol near bedtime (eg, late afternoon and evening)  -Avoid smoking or other nicotine intake, particularly during the evening  -Adjust the bedroom environment as needed to decrease stimuli (eg, reduce ambient light, turn off the television or radio)  -Avoid prolonged use of light-emitting screens (laptops, tablets, smartphones, ebooks) before bedtime [16]  -Resolve concerns or worries before bedtime  -Exercise regularly for at least 20 minutes, preferably more than four to five hours prior to bedtime [17,18]  -Avoid daytime naps, especially if they are longer than 20 to 30 minutes or occur late in the day  -Relaxation technics like back around sounds (white noise, fan, waves, rain and thunders), sleep guided meditation, breathing technics       -can try melatonin 1mg (studies show 1 mg works better than higher doses)    -Dr. Heather Marshall- psychologist, board certified by the [facility] board of sleep medicine, [facility] sleep center.

## 2022-10-18 SDOH — HEALTH STABILITY: PHYSICAL HEALTH: ON AVERAGE, HOW MANY DAYS PER WEEK DO YOU ENGAGE IN MODERATE TO STRENUOUS EXERCISE (LIKE A BRISK WALK)?: 3 DAYS

## 2022-10-18 SDOH — HEALTH STABILITY: PHYSICAL HEALTH: ON AVERAGE, HOW MANY MINUTES DO YOU ENGAGE IN EXERCISE AT THIS LEVEL?: 30 MIN

## 2022-10-18 ASSESSMENT — SOCIAL DETERMINANTS OF HEALTH (SDOH)
WITHIN THE LAST YEAR, HAVE YOU BEEN AFRAID OF YOUR PARTNER OR EX-PARTNER?: NO
WITHIN THE LAST YEAR, HAVE YOU BEEN KICKED, HIT, SLAPPED, OR OTHERWISE PHYSICALLY HURT BY YOUR PARTNER OR EX-PARTNER?: NO
WITHIN THE LAST YEAR, HAVE YOU BEEN HUMILIATED OR EMOTIONALLY ABUSED IN OTHER WAYS BY YOUR PARTNER OR EX-PARTNER?: NO
WITHIN THE LAST YEAR, HAVE TO BEEN RAPED OR FORCED TO HAVE ANY KIND OF SEXUAL ACTIVITY BY YOUR PARTNER OR EX-PARTNER?: NO

## 2022-10-21 ENCOUNTER — OFFICE VISIT (OUTPATIENT)
Dept: INTERNAL MEDICINE CLINIC | Age: 52
End: 2022-10-21
Payer: COMMERCIAL

## 2022-10-21 VITALS
HEART RATE: 91 BPM | WEIGHT: 152.4 LBS | TEMPERATURE: 97.6 F | OXYGEN SATURATION: 98 % | DIASTOLIC BLOOD PRESSURE: 82 MMHG | SYSTOLIC BLOOD PRESSURE: 128 MMHG | BODY MASS INDEX: 26.02 KG/M2 | HEIGHT: 64 IN

## 2022-10-21 DIAGNOSIS — F51.04 PSYCHOPHYSIOLOGICAL INSOMNIA: ICD-10-CM

## 2022-10-21 DIAGNOSIS — F41.9 ANXIETY AND DEPRESSION: ICD-10-CM

## 2022-10-21 DIAGNOSIS — Z76.89 ENCOUNTER TO ESTABLISH CARE: Primary | ICD-10-CM

## 2022-10-21 DIAGNOSIS — G43.719 INTRACTABLE CHRONIC MIGRAINE WITHOUT AURA AND WITHOUT STATUS MIGRAINOSUS: ICD-10-CM

## 2022-10-21 DIAGNOSIS — F32.A ANXIETY AND DEPRESSION: ICD-10-CM

## 2022-10-21 PROCEDURE — 99214 OFFICE O/P EST MOD 30 MIN: CPT | Performed by: INTERNAL MEDICINE

## 2022-10-21 RX ORDER — CHOLECALCIFEROL (VITAMIN D3) 125 MCG
1 CAPSULE ORAL NIGHTLY PRN
Refills: 0 | COMMUNITY
Start: 2022-10-21 | End: 2022-11-20

## 2022-10-21 RX ORDER — PROMETHAZINE HYDROCHLORIDE 25 MG/1
25 TABLET ORAL DAILY PRN
COMMUNITY
Start: 2022-09-28

## 2022-10-21 RX ORDER — CODEINE/BUTALBITAL/ASA/CAFFEIN 30-50-325
1 CAPSULE ORAL DAILY PRN
COMMUNITY
Start: 2002-10-18

## 2022-10-21 RX ORDER — PROGESTERONE 100 MG/1
100 CAPSULE ORAL DAILY
COMMUNITY
Start: 2022-06-03

## 2022-10-26 RX ORDER — DULOXETIN HYDROCHLORIDE 30 MG/1
30 CAPSULE, DELAYED RELEASE ORAL DAILY
Qty: 30 CAPSULE | Refills: 0 | Status: SHIPPED | OUTPATIENT
Start: 2022-10-26 | End: 2022-11-28

## 2022-11-28 RX ORDER — DULOXETIN HYDROCHLORIDE 30 MG/1
30 CAPSULE, DELAYED RELEASE ORAL DAILY
Qty: 30 CAPSULE | Refills: 0 | Status: SHIPPED | OUTPATIENT
Start: 2022-11-28

## 2022-12-20 RX ORDER — DULOXETIN HYDROCHLORIDE 30 MG/1
30 CAPSULE, DELAYED RELEASE ORAL DAILY
Qty: 30 CAPSULE | Refills: 0 | Status: SHIPPED | OUTPATIENT
Start: 2022-12-20

## 2022-12-21 ENCOUNTER — TELEPHONE (OUTPATIENT)
Dept: INTERNAL MEDICINE CLINIC | Age: 52
End: 2022-12-21

## 2022-12-21 NOTE — TELEPHONE ENCOUNTER
Patient is requesting a refill on a prescription not called in by Dr. Heriberto Zhao previously. She is requesting Fiorinal without codeine. Please call in to:  [address], [address] [place] [address]   Phone:  [phone]  Fax:  [phone]    Please contact patient with any additional questions.

## 2022-12-28 RX ORDER — CODEINE/BUTALBITAL/ASA/CAFFEIN 30-50-325
1 CAPSULE ORAL DAILY PRN
Qty: 30 CAPSULE | Refills: 1 | OUTPATIENT
Start: 2022-12-28

## 2022-12-28 NOTE — PROGRESS NOTES
Place patient label inside box (if no patient label, complete below)  Name:  :  MR#:   Rosario Mehta / PROCEDURE  I (we), Cindy Boas (Patient Name) authorize DR. David Cuello (Provider / Alexandra Wallace) and/or such assistants as may be selected by him/her, to perform the following operation/procedure(s): RIGHT LONG FINGER A1 PULLEY RELEASE       Note: If unable to obtain consent prior to an emergent procedure, document the emergent reason in the medical record. This procedure has been explained to my (our) satisfaction and included in the explanation was: The intended benefit, nature, and extent of the procedure to be performed; The significant risks involved and the probability of success; Alternative procedures and methods of treatment; The dangers and probable consequences of such alternatives (including no procedure or treatment); The expected consequences of the procedure on my future health; Whether other qualified individuals would be performing important surgical tasks and/or whether  would be present to advise or support the procedure. I (we) understand that there are other risks of infection and other serious complications in the pre-operative/procedural and postoperative/procedural stages of my (our) care. I (we) have asked all of the questions which I (we) thought were important in deciding whether or not to undergo treatment or diagnosis. These questions have been answered to my (our) satisfaction. I (we) understand that no assurance can be given that the procedure will be a success, and no guarantee or warranty of success has been given to me (us). It has been explained to me (us) that during the course of the operation/procedure, unforeseen conditions may be revealed that necessitate extension of the original procedure(s) or different procedure(s) than those set forth in Paragraph 1.  I (we) authorize and request that the above-named physician, his/her assistants or his/her designees, perform procedures as necessary and desirable if deemed to be in my (our) best interest.     Revised 8/2/2021                                                                          Page 1 of 2       I acknowledge that health care personnel may be observing this procedure for the purpose of medical education or other specified purposes as may be necessary as requested and/or approved by my (our) physician. I (we) consent to the disposal by the hospital Pathologist of the removed tissue, parts or organs in accordance with hospital policy. I do ____ do not ____ consent to the use of a local infiltration pain blocking agent that will be used by my provider/surgical provider to help alleviate pain during my procedure. I do ____ do not ____ consent to an emergent blood transfusion in the case of a life-threatening situation that requires blood components to be administered. This consent is valid for 24 hours from the beginning of the procedure. This patient does ____ or does not ____ currently have a DNR status/order. If DNR order is in place, obtain Addendum to the Surgical Consent for ALL Patients with a DNR Order to address lizette-operative status for limited intervention or DNR suspension.      I have read and fully understand the above Consent for Operation/Procedure and that all blanks were completed before I signed the consent.   _____________________________       _____________________      ____/____am/pm  Signature of Patient or legal representative      Printed Name / Relationship            Date / Time   ____________________________       _____________________      ____/____am/pm  Witness to Signature                                    Printed Name                    Date / Time    If patient is unable to sign or is a minor, complete the following)  Patient is a minor, ____ years of age, or unable to sign because: ______________________________________________________________________________________________    If a phone consent is obtained, consent will be documented by using two health care professionals, each affirming that the consenting party has no questions and gives consent for the procedure discussed with the physician/provider.   _____________________          ____________________       _____/_____am/pm   2nd witness to phone consent        Printed name           Date / Time    Informed Consent:  I have provided the explanation described above in section 1 to the patient and/or legal representative.  I have provided the patient and/or legal representative with an opportunity to ask any questions about the proposed operation/procedure.   ___________________________          ____________________         ____/____am/pm  Provider / Proceduralist                            Printed name            Date / Time  Revised 8/2/2021                                                                      Page 2 of 2

## 2022-12-29 ENCOUNTER — HOSPITAL ENCOUNTER (OUTPATIENT)
Age: 52
Setting detail: OUTPATIENT SURGERY
Discharge: HOME | End: 2022-12-29
Attending: ORTHOPAEDIC SURGERY | Admitting: ORTHOPAEDIC SURGERY
Payer: COMMERCIAL

## 2022-12-29 VITALS
SYSTOLIC BLOOD PRESSURE: 124 MMHG | OXYGEN SATURATION: 100 % | HEART RATE: 84 BPM | HEIGHT: 64 IN | BODY MASS INDEX: 25.96 KG/M2 | WEIGHT: 152.06 LBS | DIASTOLIC BLOOD PRESSURE: 81 MMHG | RESPIRATION RATE: 18 BRPM | TEMPERATURE: 98.9 F

## 2022-12-29 DIAGNOSIS — M65.331 TRIGGER MIDDLE FINGER OF RIGHT HAND: Primary | ICD-10-CM

## 2022-12-29 PROCEDURE — A4217 STERILE WATER/SALINE, 500 ML: HCPCS | Performed by: ORTHOPAEDIC SURGERY

## 2022-12-29 PROCEDURE — 7100000010 HC PHASE II RECOVERY - FIRST 15 MIN: Performed by: ORTHOPAEDIC SURGERY

## 2022-12-29 PROCEDURE — 2709999900 HC NON-CHARGEABLE SUPPLY: Performed by: ORTHOPAEDIC SURGERY

## 2022-12-29 PROCEDURE — 6370000000 HC RX 637 (ALT 250 FOR IP): Performed by: ORTHOPAEDIC SURGERY

## 2022-12-29 PROCEDURE — 3600000004 HC SURGERY LEVEL 4 BASE: Performed by: ORTHOPAEDIC SURGERY

## 2022-12-29 PROCEDURE — 7100000011 HC PHASE II RECOVERY - ADDTL 15 MIN: Performed by: ORTHOPAEDIC SURGERY

## 2022-12-29 PROCEDURE — 3600000014 HC SURGERY LEVEL 4 ADDTL 15MIN: Performed by: ORTHOPAEDIC SURGERY

## 2022-12-29 PROCEDURE — 2580000003 HC RX 258: Performed by: ORTHOPAEDIC SURGERY

## 2022-12-29 PROCEDURE — 2500000003 HC RX 250 WO HCPCS: Performed by: ORTHOPAEDIC SURGERY

## 2022-12-29 RX ORDER — METFORMIN HYDROCHLORIDE 500 MG/1
500 TABLET, EXTENDED RELEASE ORAL DAILY
COMMUNITY
Start: 2022-12-20

## 2022-12-29 RX ORDER — SODIUM CHLORIDE, SODIUM LACTATE, POTASSIUM CHLORIDE, CALCIUM CHLORIDE 600; 310; 30; 20 MG/100ML; MG/100ML; MG/100ML; MG/100ML
INJECTION, SOLUTION INTRAVENOUS CONTINUOUS
Status: CANCELLED | OUTPATIENT
Start: 2022-12-29

## 2022-12-29 RX ORDER — MAGNESIUM HYDROXIDE 1200 MG/15ML
LIQUID ORAL CONTINUOUS PRN
Status: COMPLETED | OUTPATIENT
Start: 2022-12-29 | End: 2022-12-29

## 2022-12-29 RX ORDER — HYDROCODONE BITARTRATE AND ACETAMINOPHEN 5; 325 MG/1; MG/1
1 TABLET ORAL EVERY 6 HOURS PRN
Qty: 10 TABLET | Refills: 0 | Status: SHIPPED | OUTPATIENT
Start: 2022-12-29 | End: 2023-01-01

## 2022-12-29 ASSESSMENT — PAIN SCALES - GENERAL: PAINLEVEL_OUTOF10: 0

## 2022-12-29 ASSESSMENT — PAIN - FUNCTIONAL ASSESSMENT: PAIN_FUNCTIONAL_ASSESSMENT: 0-10

## 2022-12-29 NOTE — H&P
I have reviewed the history and physical and examined the patient and find no relevant changes. I have reviewed with the patient and/or family the risks, benefits, and alternatives to the procedure.     Sukhdeep Roman MD  12/29/2022

## 2022-12-29 NOTE — OP NOTE
under no sedation, was able to flex and extend the finger gently, no persistent clicking or locking. No other abnormality was noted. The wound was copiously irrigated and then the skin was closed with interrupted nylon suture. all fingers were warm and well perfused. Soft sterile dressing was placed. Patient  tolerated the procedure well and was taken to the postanesthesia recovery unit in good condition. No complications during the case. Findings:         Intervention:  1% Lidocaine with  epinephrine    Other Notes: Follow-up 7-10 days, wound inspection, likely suture removal.  Home exercise program for ROM, massage, progressive activities. Hand Therapy referral with modalities, if needed, or if patient desires.         Feroz Tobias MD   Hand & Upper Extremity Surgery  [facility]  Orthopaedics & Sports medicine

## 2022-12-29 NOTE — PROGRESS NOTES
INTRA OP VITALS:      BP              O2 SAT %              HEART RATE              RESP  124/78            99                           92                                 16           0735  124/76            99                            96                                 20          0740  125/76            98                           100                                20          0745  117/79            99                            99                                 18           0750  113/72            98                            98                                  16         0755  117/76            83                            98                                  16         0800  119/74            86                            96                                   17        0805

## 2022-12-29 NOTE — PROGRESS NOTES
Ambulatory Surgery/Procedure Discharge Note    Vitals:    12/29/22 0821   BP: 124/81   Pulse: 84   Resp: 18   Temp: 98.9 °F (37.2 °C)   SpO2: 100%   Discharge criteria met per Yury score. Pt did not have anesthesia just local.     In: 100 [P.O.:100]  Out: -     Restroom use offered before discharge. Yes    Pain assessment:  none  Pain Level: 0 (Pt reports numb)    Patient discharged to home/self care. Patient discharged via wheel chair by transporter to waiting family/S.O.       12/29/2022 8:59 AM Pt and S.O./family states \"ready to go home\". Pt alert and oriented x4. Denies N/V or pain. Dressing clean dry and intact. Discharge instructions given to pt and  with pt permission. Pt and  verbalized understanding of all instructions. Left with all belongings, 1 prescriptions, and discharge instructions.

## 2022-12-29 NOTE — BRIEF OP NOTE
Brief Postoperative Note      Patient: Jael Baez  YOB: 1970  MRN: [id number]    Date of Procedure: 12/29/2022    Pre-Op Diagnosis: Trigger middle finger of right hand [M65.331]    Post-Op Diagnosis: Same       Procedure(s):  RIGHT LONG FINGER A1 PULLEY RELEASE    Surgeon(s):  Kervin Hart MD    Assistant:  Surgical Assistant: Celine Stratton    Anesthesia: Local    Estimated Blood Loss (mL): less than 5ml     Complications: None immediate apparent     Specimens:   none    Implants:  none      Drains: none    Findings: see fully dictated operative report     Electronically signed by Yaneth Quiroz MD on 12/29/2022 at 8:09 AM

## 2022-12-29 NOTE — PERIOP NOTE
Patient took valium 2mg PO at 5889, med was prescribed by Dr Dickson Haas as a one time dose for pre-op anxiety

## 2022-12-29 NOTE — DISCHARGE INSTRUCTIONS
Post op Instructions for Hand and Upper Extremity Surgery    * Keep dressing dry and clean. It is ok to Shower just keep Dressing dry. * Elevate operative arm. * Ice 20 minutes on 20 minutes off. * Follow-up appointment as scheduled by office staff. Check paperwork from office. If no appointment scheduled call the office. * If dressing is too tight, you may loosen Ace bandage and leave splint in place. * Sling, as needed  * If you have any questions, refer to the office number listed below.     [phone] ([address])

## 2023-01-02 DIAGNOSIS — G43.719 INTRACTABLE CHRONIC MIGRAINE WITHOUT AURA AND WITHOUT STATUS MIGRAINOSUS: Primary | ICD-10-CM

## 2023-01-05 RX ORDER — CODEINE/BUTALBITAL/ASA/CAFFEIN 30-50-325
CAPSULE ORAL
Qty: 30 CAPSULE | Refills: 0 | Status: SHIPPED | OUTPATIENT
Start: 2023-01-05 | End: 2023-02-04

## 2023-01-05 NOTE — TELEPHONE ENCOUNTER
Controlled Substance Monitoring:    Acute and Chronic Pain Monitoring:   RX Monitoring 1/5/2023   Attestation -   Periodic Controlled Substance Monitoring Obtaining appropriate analgesic effect of treatment.

## 2023-01-24 RX ORDER — DULOXETIN HYDROCHLORIDE 30 MG/1
30 CAPSULE, DELAYED RELEASE ORAL DAILY
Qty: 30 CAPSULE | Refills: 1 | Status: SHIPPED | OUTPATIENT
Start: 2023-01-24

## 2023-02-21 ENCOUNTER — TELEPHONE (OUTPATIENT)
Dept: INTERNAL MEDICINE CLINIC | Age: 53
End: 2023-02-21

## 2023-02-22 SDOH — ECONOMIC STABILITY: HOUSING INSECURITY
IN THE LAST 12 MONTHS, WAS THERE A TIME WHEN YOU DID NOT HAVE A STEADY PLACE TO SLEEP OR SLEPT IN A SHELTER (INCLUDING NOW)?: NO

## 2023-02-22 SDOH — ECONOMIC STABILITY: INCOME INSECURITY: HOW HARD IS IT FOR YOU TO PAY FOR THE VERY BASICS LIKE FOOD, HOUSING, MEDICAL CARE, AND HEATING?: NOT HARD AT ALL

## 2023-02-22 SDOH — ECONOMIC STABILITY: FOOD INSECURITY: WITHIN THE PAST 12 MONTHS, YOU WORRIED THAT YOUR FOOD WOULD RUN OUT BEFORE YOU GOT MONEY TO BUY MORE.: NEVER TRUE

## 2023-02-22 SDOH — ECONOMIC STABILITY: FOOD INSECURITY: WITHIN THE PAST 12 MONTHS, THE FOOD YOU BOUGHT JUST DIDN'T LAST AND YOU DIDN'T HAVE MONEY TO GET MORE.: NEVER TRUE

## 2023-02-23 ENCOUNTER — OFFICE VISIT (OUTPATIENT)
Dept: INTERNAL MEDICINE CLINIC | Age: 53
End: 2023-02-23

## 2023-02-23 VITALS
DIASTOLIC BLOOD PRESSURE: 78 MMHG | OXYGEN SATURATION: 97 % | WEIGHT: 156.8 LBS | HEART RATE: 86 BPM | SYSTOLIC BLOOD PRESSURE: 122 MMHG | BODY MASS INDEX: 26.91 KG/M2

## 2023-02-23 DIAGNOSIS — R73.03 PRE-DIABETES: ICD-10-CM

## 2023-02-23 DIAGNOSIS — R63.5 ABNORMAL WEIGHT GAIN: ICD-10-CM

## 2023-02-23 DIAGNOSIS — E78.5 HYPERLIPIDEMIA, UNSPECIFIED HYPERLIPIDEMIA TYPE: ICD-10-CM

## 2023-02-23 DIAGNOSIS — R63.5 ABNORMAL WEIGHT GAIN: Primary | ICD-10-CM

## 2023-02-23 LAB — HBA1C MFR BLD: 4.8 %

## 2023-02-23 ASSESSMENT — PATIENT HEALTH QUESTIONNAIRE - PHQ9
2. FEELING DOWN, DEPRESSED OR HOPELESS: 0
SUM OF ALL RESPONSES TO PHQ QUESTIONS 1-9: 0
10. IF YOU CHECKED OFF ANY PROBLEMS, HOW DIFFICULT HAVE THESE PROBLEMS MADE IT FOR YOU TO DO YOUR WORK, TAKE CARE OF THINGS AT HOME, OR GET ALONG WITH OTHER PEOPLE: 0
6. FEELING BAD ABOUT YOURSELF - OR THAT YOU ARE A FAILURE OR HAVE LET YOURSELF OR YOUR FAMILY DOWN: 0
5. POOR APPETITE OR OVEREATING: 0
8. MOVING OR SPEAKING SO SLOWLY THAT OTHER PEOPLE COULD HAVE NOTICED. OR THE OPPOSITE, BEING SO FIGETY OR RESTLESS THAT YOU HAVE BEEN MOVING AROUND A LOT MORE THAN USUAL: 0
SUM OF ALL RESPONSES TO PHQ QUESTIONS 1-9: 0
4. FEELING TIRED OR HAVING LITTLE ENERGY: 0
SUM OF ALL RESPONSES TO PHQ9 QUESTIONS 1 & 2: 0
9. THOUGHTS THAT YOU WOULD BE BETTER OFF DEAD, OR OF HURTING YOURSELF: 0
SUM OF ALL RESPONSES TO PHQ QUESTIONS 1-9: 0
SUM OF ALL RESPONSES TO PHQ QUESTIONS 1-9: 0
3. TROUBLE FALLING OR STAYING ASLEEP: 0
7. TROUBLE CONCENTRATING ON THINGS, SUCH AS READING THE NEWSPAPER OR WATCHING TELEVISION: 0
1. LITTLE INTEREST OR PLEASURE IN DOING THINGS: 0

## 2023-02-23 NOTE — PROGRESS NOTES
Katya Templeton (:  1970) is a 45 y.o. female, here for evaluation of the following chief complaint(s):    Follow-up and Weight Management (Cataract surgery/Colonoscopy/Trigger finger surgery )      ASSESSMENT/PLAN:  1. Abnormal weight gain  Advised her to discuss cutting back Seroquel with prescribing MD as this drug does make it more difficult to lose weight. We discussed that Topamax may be an option to help with weight loss due to her history of migraines. First she will try Craig over-the-counter. She will also explore whether GLP-1 agonists are covered by her health plan. -     TSH; Future  -     POCT glycosylated hemoglobin (Hb A1C)  2. Pre-diabetes  -     Vitamin B12; Future  3. Hyperlipidemia, unspecified hyperlipidemia type  -     Lipid Panel; Future    FU if preops needed      SUBJECTIVE/OBJECTIVE:  HPI  Patient is here to discuss her frustrations with her weight. She states her average weight have been 135-140. Her weight today is 156. She knows it is in part due to menopause. She states she tries to do a lot of exercise and eat well. She has seen a nutritionist.  Her gynecologist started her on metformin but it did not help. She takes Metamucil. She is on Wellbutrin which can sometimes help with weight loss. We did discuss that a recent job change that is more sedentary may be contributing. She has had a few steroid shots for her orthopedic issues.     Review of Systems    Past Medical History:   Diagnosis Date    Allergic rhinitis, cause unspecified     Chronic    Anxiety state, unspecified     Cancer ([address]     basal under left eye    CVA (cerebral vascular accident) ([address] 2020    retinal artery occlusion, takes aspirin - right eye - field of vision affected    Endometriosis 2005    Ganglion cyst 2009    HA (headache)     Hemorrhage of rectum and anus     hemorrhoid    Insomnia, unspecified     Migraine, unspecified, without mention of intractable migraine without mention of status migrainosus     Chronic    Osteopenia     Screening mammogram 12/02/2009    Benign    Sprain of neck     Bilateral    Unspecified constipation     Unspecified thrombosed hemorrhoids        Current Outpatient Medications   Medication Sig Dispense Refill    DULoxetine (CYMBALTA) 30 MG extended release capsule TAKE 1 CAPSULE BY MOUTH DAILY 30 capsule 1    metFORMIN (GLUCOPHAGE-XR) 500 MG extended release tablet Take 500 mg by mouth daily      progesterone (PROMETRIUM) 100 MG CAPS capsule Take 100 mg by mouth daily      promethazine (PHENERGAN) 25 MG tablet Take 25 mg by mouth daily as needed      melatonin 5 MG TABS tablet Take 1 tablet by mouth nightly as needed (insomnia)  0    buPROPion (WELLBUTRIN XL) 300 MG extended release tablet Take 1 tablet by mouth every morning 90 tablet 0    buPROPion (WELLBUTRIN XL) 150 MG extended release tablet Take 1 tablet by mouth every morning With the 300 mg tab 90 tablet 0    QUEtiapine (SEROQUEL) 100 MG tablet TAKE 2 TO 3 TABLETS BY MOUTH EVERY NIGHT 90 tablet 0    aspirin 81 MG EC tablet Take 81 mg by mouth daily      ketorolac (TORADOL) 30 MG/ML injection Inject 1 mL IM PRN for migraine, not to exceed 4mL in 1 month 4 mL 5    NEEDLE, DISP, 25 G 25G X 1-1/4\" MISC Use for Toradol injection 4 each 0    tiZANidine (ZANAFLEX) 4 MG tablet       NURTEC 75 MG TBDP       valACYclovir (VALTREX) 1 g tablet       hydrocortisone (ANUSOL-HC) 2.5 % CREA rectal cream Apply up to tid 1 Tube 1    SYRINGE-NEEDLE, DISP, 3 ML (B-D 3CC LUER-MACIE SYR 25GX1\") 25G X 1\" 3 ML MISC USE FOR TORADOL INJECTION 100 each 0    montelukast (SINGULAIR) 10 MG tablet Take 10 mg by mouth nightly. fluticasone (FLOVENT HFA) 44 MCG/ACT inhaler Inhale 1 puff into the lungs 2 times daily. No current facility-administered medications for this visit. Physical Exam  Neurological:      General: No focal deficit present. Mental Status: She is oriented to person, place, and time.    Psychiatric: Mood and Affect: Mood normal.         On this date 2/23/2023 I have spent 30 minutes reviewing previous notes, test results and face to face with the patient discussing the diagnosis and importance of compliance with the treatment plan as well as documenting on the day of the visit. This note was generated completely or in part utilizing Dragon dictation speech recognition software. Occasionally, words are mistranscribed and despite editing, the text may contain inaccuracies due to incorrect word recognition. If further clarification is needed please contact the office at [phone]          An electronic signature was used to authenticate this note.     --Gage Chawla MD negative...

## 2023-02-24 LAB
CHOLESTEROL, TOTAL: 236 MG/DL (ref 0–199)
HDLC SERPL-MCNC: 75 MG/DL (ref 40–60)
LDL CHOLESTEROL CALCULATED: 141 MG/DL
TRIGL SERPL-MCNC: 98 MG/DL (ref 0–150)
TSH SERPL DL<=0.05 MIU/L-ACNC: 2.08 UIU/ML (ref 0.27–4.2)
VITAMIN B-12: 357 PG/ML (ref 211–911)
VLDLC SERPL CALC-MCNC: 20 MG/DL

## 2023-03-29 RX ORDER — DULOXETIN HYDROCHLORIDE 30 MG/1
30 CAPSULE, DELAYED RELEASE ORAL DAILY
Qty: 30 CAPSULE | Refills: 1 | Status: SHIPPED | OUTPATIENT
Start: 2023-03-29

## 2023-06-01 ENCOUNTER — OFFICE VISIT (OUTPATIENT)
Dept: CARDIOLOGY CLINIC | Age: 53
End: 2023-06-01
Payer: COMMERCIAL

## 2023-06-01 VITALS
BODY MASS INDEX: 25.16 KG/M2 | SYSTOLIC BLOOD PRESSURE: 134 MMHG | DIASTOLIC BLOOD PRESSURE: 92 MMHG | HEART RATE: 78 BPM | WEIGHT: 146.6 LBS

## 2023-06-01 DIAGNOSIS — R00.2 PALPITATIONS: Primary | ICD-10-CM

## 2023-06-01 PROCEDURE — 99214 OFFICE O/P EST MOD 30 MIN: CPT | Performed by: INTERNAL MEDICINE

## 2023-06-01 RX ORDER — METOPROLOL SUCCINATE 25 MG/1
25 TABLET, EXTENDED RELEASE ORAL DAILY
Qty: 90 TABLET | Refills: 3 | Status: SHIPPED | OUTPATIENT
Start: 2023-06-01

## 2023-06-02 NOTE — PROGRESS NOTES
Cc: palpitations    HPI:     Patient is a 53-year-old woman with history of migraines (currently on Botox and Fiorinal 1-3 times per week), \"right eye stroke\" (normal MRI brain 2016), palpitations, anxiety, depression. Patient started complaining of racing heartbeats the last month, worse over the last 2 weeks. The last couple of weeks he has also had chest discomfort with palpitations. Otherwise he is quite active at home and likes to exercise, does spinning 30 minutes 2 times per week and walks the dogs, without any symptoms. Patient was seen at [facility] emergency room on 4/18/2022 and her work-up was within normal limits. Her TSH was normal.     Patient reports constant symptoms on a daily basis. She only drinks 1 cup of coffee per day and avoids any caffeinated drinks. She does not have any exertional symptoms. Patient is scheduled to go on multiple trips in a couple of weeks and would like to have her cardiac work-up completed before that. ECG 5/5/2022: NSR, normal ECG. CAM 5/5 - 5/8/2022: Patient reports symptoms during the first day of wearing the monitor. There were no tachyarrhythmias or ectopies. Echo 5/5/2022: Normal     Patient is here for follow-up. She reports rare episodes of racing heartbeats about twice per month. She drinks only 1 cup of coffee per day. Histories     Past Medical History:   has a past medical history of Allergic rhinitis, cause unspecified, Anxiety state, unspecified, Cancer ([address] CVA (cerebral vascular accident) ([address] Endometriosis, Ganglion cyst, HA (headache), Hemorrhage of rectum and anus, Insomnia, unspecified, Migraine, unspecified, without mention of intractable migraine without mention of status migrainosus, Osteopenia, Screening mammogram, Sprain of neck, Unspecified constipation, and Unspecified thrombosed hemorrhoids. Surgical History:   has a past surgical history that includes Colonoscopy (05/16/2008);  Nasal septum surgery (01/01/1993);

## 2023-06-08 ENCOUNTER — TELEPHONE (OUTPATIENT)
Dept: INTERNAL MEDICINE CLINIC | Age: 53
End: 2023-06-08

## 2023-06-08 NOTE — TELEPHONE ENCOUNTER
Patient is calling with concern for pain in her right HIP. Pain is going into her right lower back. Patient states she is not available for an appointment today or tomorrow but is available for an appointment on Monday or Tuesday. Please Advise. When did you first notice the symptoms? 2-3 weeks ago  Are there any known injuries? No Known Injury  Location? Right Hip into the lower back  Pain 1-10? 4-5  Other symptoms? None  Treatment so far?  Patient is unsure how to treat

## 2023-06-09 ENCOUNTER — TELEPHONE (OUTPATIENT)
Dept: INTERNAL MEDICINE CLINIC | Age: 53
End: 2023-06-09

## 2023-06-09 RX ORDER — DULOXETIN HYDROCHLORIDE 30 MG/1
30 CAPSULE, DELAYED RELEASE ORAL DAILY
Qty: 30 CAPSULE | Refills: 1 | Status: SHIPPED | OUTPATIENT
Start: 2023-06-09

## 2023-06-09 NOTE — TELEPHONE ENCOUNTER
Patient has an appointment with  on Monday at 9:00. She has a work conflict where she needs to be on a conference call  from 9-9:30. Is there any way her appointment could be moved 1/2 sooner or 1/2 hour later? She understands she was worked in. She asked to be kept  on for 9:00 as of right now. Please call if other times are  possible at number provided.

## 2023-06-14 ENCOUNTER — OFFICE VISIT (OUTPATIENT)
Dept: INTERNAL MEDICINE CLINIC | Age: 53
End: 2023-06-14
Payer: COMMERCIAL

## 2023-06-14 VITALS
WEIGHT: 146 LBS | OXYGEN SATURATION: 98 % | DIASTOLIC BLOOD PRESSURE: 70 MMHG | SYSTOLIC BLOOD PRESSURE: 120 MMHG | BODY MASS INDEX: 25.06 KG/M2 | HEART RATE: 74 BPM

## 2023-06-14 DIAGNOSIS — M76.891 TENDINITIS OF RIGHT HIP FLEXOR: Primary | ICD-10-CM

## 2023-06-14 PROCEDURE — 99213 OFFICE O/P EST LOW 20 MIN: CPT | Performed by: INTERNAL MEDICINE

## 2023-06-29 ENCOUNTER — PATIENT MESSAGE (OUTPATIENT)
Dept: CARDIOLOGY CLINIC | Age: 53
End: 2023-06-29

## 2023-06-29 RX ORDER — METOPROLOL SUCCINATE 25 MG/1
25 TABLET, EXTENDED RELEASE ORAL 2 TIMES DAILY
Qty: 180 TABLET | Refills: 3 | Status: SHIPPED | OUTPATIENT
Start: 2023-06-29

## 2023-07-28 ENCOUNTER — TELEPHONE (OUTPATIENT)
Dept: INTERNAL MEDICINE CLINIC | Age: 53
End: 2023-07-28

## 2023-07-28 RX ORDER — QUETIAPINE FUMARATE 100 MG/1
TABLET, FILM COATED ORAL
Qty: 60 TABLET | Refills: 0 | Status: SHIPPED | OUTPATIENT
Start: 2023-07-28

## 2023-07-28 NOTE — TELEPHONE ENCOUNTER
Patient states that after discussing taking a lower dose, she did try to reduce to 1 tablet nightly. States she tried it for 2 weeks & wasn't sleeping.  She feels she needs 2/day

## 2023-07-28 NOTE — TELEPHONE ENCOUNTER
Patient is wanting to see if  can send in new Rx for Quetiapine (Seroquel) to [facility] # [id number]. Previously prescribed by Dr. Lawrence Fry. Patient almost out of medication. Last visit:  2/23/2023. No notes for when due back. Last re-fill 6/8/2022. Please call her with any updates/questions/concerns.

## 2023-07-28 NOTE — TELEPHONE ENCOUNTER
She had some \"in between\" visits. Please see me for routine visit in September. We had discussed trying to lower dose of Seroquel. Is that okay to try? Seroquel may make it harder for her to lose weight.

## 2023-07-28 NOTE — TELEPHONE ENCOUNTER
Left message on voicemail advising rx sent in & requested call back to schedule an appt for September

## 2023-08-09 DIAGNOSIS — G43.719 INTRACTABLE CHRONIC MIGRAINE WITHOUT AURA AND WITHOUT STATUS MIGRAINOSUS: ICD-10-CM

## 2023-08-10 NOTE — TELEPHONE ENCOUNTER
Is she still seeing her neurologist Dr Jennifer Merchant. If yes,would ask her to call there for refill.

## 2023-08-15 RX ORDER — DULOXETIN HYDROCHLORIDE 30 MG/1
30 CAPSULE, DELAYED RELEASE ORAL DAILY
Qty: 30 CAPSULE | Refills: 1 | Status: SHIPPED | OUTPATIENT
Start: 2023-08-15

## 2023-08-17 RX ORDER — BUPROPION HYDROCHLORIDE 150 MG/1
150 TABLET ORAL EVERY MORNING
Qty: 90 TABLET | Refills: 0 | Status: SHIPPED | OUTPATIENT
Start: 2023-08-17

## 2023-08-17 RX ORDER — BUPROPION HYDROCHLORIDE 300 MG/1
300 TABLET ORAL EVERY MORNING
Qty: 90 TABLET | Refills: 0 | Status: SHIPPED | OUTPATIENT
Start: 2023-08-17

## 2023-08-17 NOTE — TELEPHONE ENCOUNTER
Patient is calling to see if Dr. Blaine East can take over the buPROPion (WELLBUTRIN XL) 300 MG extended release tablet  Last appointment: 6/14/2023  Next appointment: 9/13/2023  Last refill: 6/8/22    buPROPion (WELLBUTRIN XL) 150 MG extended release tablet  Last appointment: 6/14/2023  Next appointment: 9/13/2023  Last refill: 6/8/22      Patient is not allowed to go to Dr. Tenzin Diamond. Since her she has changed PCP Dr. Tenzin Diamond is contracted to old PCP.    Please advise

## 2023-08-18 DIAGNOSIS — G43.719 INTRACTABLE CHRONIC MIGRAINE WITHOUT AURA AND WITHOUT STATUS MIGRAINOSUS: Primary | ICD-10-CM

## 2023-08-18 RX ORDER — BUTALBITAL, ASPIRIN, AND CAFFEINE 325; 50; 40 MG/1; MG/1; MG/1
CAPSULE ORAL
COMMUNITY
Start: 2023-06-26 | End: 2023-08-18 | Stop reason: SDUPTHER

## 2023-08-18 RX ORDER — BUTALBITAL, ASPIRIN, AND CAFFEINE 325; 50; 40 MG/1; MG/1; MG/1
1 CAPSULE ORAL EVERY 4 HOURS PRN
Qty: 42 CAPSULE | Refills: 0 | Status: SHIPPED | OUTPATIENT
Start: 2023-08-18 | End: 2023-09-17

## 2023-08-18 NOTE — TELEPHONE ENCOUNTER
Last appointment: 6/14/2023  Next appointment: 9/13/2023  Last refill: 6/26/2023    I wasn't sure about dosing so I left it blank

## 2023-08-18 NOTE — TELEPHONE ENCOUNTER
Patient just had several medications re-filled in the last few days but she is letting us know she also needs a re-fill on her Migraine medication (Butalbital-aspirin-caffeine-codeine) sent to [facility] on [address] [employer] in [facility]. Last filled 1/5/2023 but not showing on current medication list.  Last visit:  6/14/2023. Next visit:  9/13/2023.

## 2023-08-23 RX ORDER — CODEINE/BUTALBITAL/ASA/CAFFEIN 30-50-325
CAPSULE ORAL
Qty: 30 CAPSULE | Refills: 1 | OUTPATIENT
Start: 2023-08-23

## 2023-08-23 NOTE — TELEPHONE ENCOUNTER
Left another message for patient to call back    We have now left 3 messages for patient - please advise

## 2023-08-29 RX ORDER — QUETIAPINE FUMARATE 100 MG/1
TABLET, FILM COATED ORAL
Qty: 60 TABLET | Refills: 0 | Status: SHIPPED | OUTPATIENT
Start: 2023-08-29

## 2023-09-07 ENCOUNTER — OFFICE VISIT (OUTPATIENT)
Dept: CARDIOLOGY CLINIC | Age: 53
End: 2023-09-07
Payer: COMMERCIAL

## 2023-09-07 VITALS
BODY MASS INDEX: 24.82 KG/M2 | WEIGHT: 144.6 LBS | HEART RATE: 85 BPM | SYSTOLIC BLOOD PRESSURE: 138 MMHG | DIASTOLIC BLOOD PRESSURE: 80 MMHG

## 2023-09-07 DIAGNOSIS — R00.2 PALPITATIONS: Primary | ICD-10-CM

## 2023-09-07 PROCEDURE — 99214 OFFICE O/P EST MOD 30 MIN: CPT | Performed by: INTERNAL MEDICINE

## 2023-09-07 NOTE — PROGRESS NOTES
mucosa, and tongue normal   Neck: Supple, symmetrical, trachea midline, no adenopathy, thyroid: not enlarged, symmetric, no tenderness/mass/nodules, no carotid bruit       Lungs:   Clear to auscultation bilaterally, respirations unlabored   Chest Wall:  No tenderness or deformity   Heart:  Regular rhythm with occasional ectopic beats, rate is controlled, S1, S2 normal, there is no murmur, there is no rub or gallop, cannot assess jvd, no bilateral lower extremity edema   Abdomen:   Soft, non-tender, bowel sounds active all four quadrants,  no masses, no organomegaly       Extremities: Extremities normal, atraumatic, no cyanosis   Pulses: 2+ and symmetric   Skin: Skin color, texture, turgor normal, no rashes or lesions   Pysch: Normal mood and affect   Neurologic: Normal gross motor and sensory exam.  Cranial nerves intact        Labs:     Lab Results   Component Value Date    WBC 10.0 06/15/2021    HGB 13.5 06/15/2021    HCT 40.6 06/15/2021    MCV 91.1 06/15/2021     06/15/2021     Lab Results   Component Value Date     06/15/2021    K 5.0 06/15/2021     06/15/2021    CO2 27 06/15/2021    BUN 12 06/15/2021    CREATININE 0.8 06/15/2021    GLUCOSE 93 06/15/2021    CALCIUM 10.0 06/15/2021    PROT 7.6 06/15/2021    LABALBU 5.2 (H) 06/15/2021    BILITOT 0.3 06/15/2021    ALKPHOS 87 06/15/2021    AST 21 06/15/2021    ALT 18 06/15/2021    LABGLOM >60 06/15/2021    GFRAA >60 06/15/2021    AGRATIO 2.2 06/15/2021    GLOB 2.4 06/15/2021         Lab Results   Component Value Date    CHOL 236 (H) 02/23/2023    CHOL 235 (H) 06/15/2021    CHOL 156 03/16/2012     Lab Results   Component Value Date    TRIG 98 02/23/2023    TRIG 94 06/15/2021    TRIG 62 03/16/2012     Lab Results   Component Value Date    HDL 75 (H) 02/23/2023    HDL 70 (H) 06/15/2021    HDL 55 03/16/2012     Lab Results   Component Value Date    LDLCALC 141 (H) 02/23/2023    LDLCALC 146 (H) 06/15/2021    LDLCALC 89 03/16/2012     Lab Results

## 2023-09-13 ENCOUNTER — TELEMEDICINE (OUTPATIENT)
Dept: INTERNAL MEDICINE CLINIC | Age: 53
End: 2023-09-13
Payer: COMMERCIAL

## 2023-09-13 DIAGNOSIS — E78.5 HYPERLIPIDEMIA, UNSPECIFIED HYPERLIPIDEMIA TYPE: ICD-10-CM

## 2023-09-13 DIAGNOSIS — G43.719 INTRACTABLE CHRONIC MIGRAINE WITHOUT AURA AND WITHOUT STATUS MIGRAINOSUS: ICD-10-CM

## 2023-09-13 DIAGNOSIS — F32.A ANXIETY AND DEPRESSION: Primary | ICD-10-CM

## 2023-09-13 DIAGNOSIS — F41.9 ANXIETY AND DEPRESSION: Primary | ICD-10-CM

## 2023-09-13 PROCEDURE — 99214 OFFICE O/P EST MOD 30 MIN: CPT | Performed by: INTERNAL MEDICINE

## 2023-09-13 RX ORDER — ATOGEPANT 60 MG/1
1 TABLET ORAL EVERY MORNING
COMMUNITY
Start: 2023-09-11

## 2023-09-13 RX ORDER — ESTRADIOL 10 UG/1
INSERT VAGINAL
COMMUNITY
Start: 2023-09-11

## 2023-09-15 NOTE — PROGRESS NOTES
Colon report has been requested
discoloration noted on facial skin                  Psychiatric:       [x] Normal Affect         Other pertinent observable physical exam findings-        On this date 9/13/2023 I have spent 30 minutes reviewing previous notes, test results and face to face (virtual) with the patient discussing the diagnosis and importance of compliance with the treatment plan as well as documenting on the day of the visit. Edmond Bird, was evaluated through a synchronous (real-time) audio-video encounter. The patient (or guardian if applicable) is aware that this is a billable service, which includes applicable co-pays. This Virtual Visit was conducted with patient's (and/or legal guardian's) consent. Patient identification was verified, and a caregiver was present when appropriate. The patient was located at Home: [address] [place] [address]  Provider was located at [facility] (Appt Dept): [address] [facility],  [address]         Total time spent for this encounter:  30 minutes     Patient identification was verified at the start of the visit: Yes    This note was generated completely or in part utilizing [employer] speech recognition software. Occasionally, words are mistranscribed and despite editing, the text may contain inaccuracies due to incorrect word recognition. If further clarification is needed please contact the office at [phone]          An electronic signature was used to authenticate this note.     --Jane Cai MD

## 2023-09-19 RX ORDER — DULOXETIN HYDROCHLORIDE 30 MG/1
30 CAPSULE, DELAYED RELEASE ORAL DAILY
Qty: 30 CAPSULE | Refills: 2 | Status: SHIPPED | OUTPATIENT
Start: 2023-09-19

## 2023-09-21 RX ORDER — METOPROLOL SUCCINATE 25 MG/1
25 TABLET, EXTENDED RELEASE ORAL 2 TIMES DAILY
Qty: 180 TABLET | Refills: 3 | Status: SHIPPED | OUTPATIENT
Start: 2023-09-21

## 2023-09-21 NOTE — TELEPHONE ENCOUNTER
Requested Prescriptions     Pending Prescriptions Disp Refills    metoprolol succinate (TOPROL XL) 25 MG extended release tablet 180 tablet 3     Sig: Take 1 tablet by mouth 2 times daily            Last Office Visit: 09.07.2023    Next Office Visit: 03.07.2024        Last Labs: 02.23.2023

## 2023-09-22 ENCOUNTER — TELEPHONE (OUTPATIENT)
Dept: INTERNAL MEDICINE CLINIC | Age: 53
End: 2023-09-22

## 2023-09-22 NOTE — TELEPHONE ENCOUNTER
[facility] to get medication request for :  buPROPion (WELLBUTRIN XL) 150 MG extended release tablet  Last appointment: 9/13/2023  Next appointment: Visit date not found  Last refill: 8/17/23        Please send medication request to :  [address] [facility], [address] [place] [address],[address] [phone] - F [phone]

## 2023-09-22 NOTE — TELEPHONE ENCOUNTER
Last appointment: 9/13/2023  Next appointment: Visit date not found (Not due until march 2024)  Last refill:   Appointment not due for >6 months.

## 2023-09-23 RX ORDER — BUPROPION HYDROCHLORIDE 300 MG/1
300 TABLET ORAL EVERY MORNING
Qty: 90 TABLET | Refills: 0 | Status: SHIPPED | OUTPATIENT
Start: 2023-09-23

## 2023-09-27 RX ORDER — METOPROLOL SUCCINATE 25 MG/1
TABLET, EXTENDED RELEASE ORAL
Qty: 90 TABLET | Refills: 3 | Status: SHIPPED | OUTPATIENT
Start: 2023-09-27

## 2023-09-27 NOTE — TELEPHONE ENCOUNTER
Requested Prescriptions      No prescriptions requested or ordered in this encounter          Last Office Visit: 9/7/23    Next Office Visit: 3/7/24

## 2023-09-29 RX ORDER — QUETIAPINE FUMARATE 100 MG/1
TABLET, FILM COATED ORAL
Qty: 60 TABLET | Refills: 0 | Status: SHIPPED | OUTPATIENT
Start: 2023-09-29

## 2023-09-29 NOTE — TELEPHONE ENCOUNTER
Last appointment: 9/13/2023  Next appointment: Visit date not found  Last refill: 8/29/2023  Appointment not due for >6 months.

## 2023-10-06 DIAGNOSIS — G43.719 INTRACTABLE CHRONIC MIGRAINE WITHOUT AURA AND WITHOUT STATUS MIGRAINOSUS: ICD-10-CM

## 2023-10-06 DIAGNOSIS — G44.229 CHRONIC TENSION-TYPE HEADACHE, NOT INTRACTABLE: ICD-10-CM

## 2023-10-06 NOTE — TELEPHONE ENCOUNTER
-Patient said that Dr. Rigo Kolb could not get her in until 3/19/24, so she is going to continue to see current neurologist until then.   -No further appointments will be made for Sudhir due to her not being satisfied with them.

## 2023-10-06 NOTE — TELEPHONE ENCOUNTER
Please find out if/when patient is scheduled with Dr Gabo Clayton to further address her headaches and then send back. If not planning to see him, when is her next [facility] appt?

## 2023-10-06 NOTE — TELEPHONE ENCOUNTER
----- Message from Carissa Martin sent at 42/8/6997  9:34 AM EDT -----  Subject: Medication Problem    Medication: Other - Fiorinal w/Codeine 30 day   Dosage: -07-30 1 capsule every 8 hours   Ordering Provider: Brain Chambers    Question/Problem: Pt was advised at her last appointment by Popeye Lai   to call in to have it filled once a year      Pharmacy: [address] [facility] [address], [address]   [phone] Sundar Fernandes [phone]    ---------------------------------------------------------------------------  --------------  Phyllis [place] Sujata  [phone]; OK to leave message on voicemail  ---------------------------------------------------------------------------  --------------    SCRIPT ANSWERS  Relationship to Patient: Self

## 2023-10-07 RX ORDER — BUTALBITAL, ASPIRIN, AND CAFFEINE 325; 50; 40 MG/1; MG/1; MG/1
1 CAPSULE ORAL DAILY PRN
Qty: 30 CAPSULE | Refills: 0 | Status: SHIPPED | OUTPATIENT
Start: 2023-10-07 | End: 2023-10-09

## 2023-10-09 ENCOUNTER — HOSPITAL ENCOUNTER (OUTPATIENT)
Dept: CT IMAGING | Age: 53
Discharge: HOME | End: 2023-10-09
Payer: COMMERCIAL

## 2023-10-09 DIAGNOSIS — E78.5 HYPERLIPIDEMIA, UNSPECIFIED HYPERLIPIDEMIA TYPE: ICD-10-CM

## 2023-10-09 PROCEDURE — 75571 CT HRT W/O DYE W/CA TEST: CPT

## 2023-10-09 NOTE — TELEPHONE ENCOUNTER
Please advise patient to make an office visit. Codeine is a controlled substance which has additional requirements.

## 2023-10-09 NOTE — TELEPHONE ENCOUNTER
Patient calling back to say what was sent to the pharmacy was not correct. She states it was supposed to be Fiorinal w/Codeine. What was sent in did no t have codeine.  Please send in Fiorinal w/Codeine -[phone]

## 2023-10-10 NOTE — TELEPHONE ENCOUNTER
Call pharmacy to cancel the fiorinal. If she has not yet picked it up, will send the Fioricet w codeine. Let me know. She has told me she takes it on a very limited basis. If she requires a refill prior to seeing the neurologist, she will need to make an appointment with me to do a controlled substance contract.

## 2023-10-11 RX ORDER — CODEINE/BUTALBITAL/ASA/CAFFEIN 30-50-325
1 CAPSULE ORAL DAILY PRN
Qty: 30 CAPSULE | Refills: 0 | Status: SHIPPED | OUTPATIENT
Start: 2023-10-11 | End: 2023-11-10

## 2023-10-17 ENCOUNTER — OFFICE VISIT (OUTPATIENT)
Dept: INTERNAL MEDICINE CLINIC | Age: 53
End: 2023-10-17
Payer: COMMERCIAL

## 2023-10-17 VITALS
OXYGEN SATURATION: 97 % | BODY MASS INDEX: 25.4 KG/M2 | SYSTOLIC BLOOD PRESSURE: 112 MMHG | HEART RATE: 69 BPM | DIASTOLIC BLOOD PRESSURE: 72 MMHG | WEIGHT: 148 LBS

## 2023-10-17 DIAGNOSIS — G43.719 INTRACTABLE CHRONIC MIGRAINE WITHOUT AURA AND WITHOUT STATUS MIGRAINOSUS: Primary | ICD-10-CM

## 2023-10-17 PROCEDURE — 99213 OFFICE O/P EST LOW 20 MIN: CPT | Performed by: INTERNAL MEDICINE

## 2023-10-17 RX ORDER — ROSUVASTATIN CALCIUM 5 MG/1
5 TABLET, COATED ORAL DAILY
Qty: 30 TABLET | Refills: 3 | Status: CANCELLED | OUTPATIENT
Start: 2023-10-17

## 2023-10-17 NOTE — PROGRESS NOTES
Pennie Favre (:  1970) is a 52 y.o. female, here for evaluation of the following chief complaint(s):    Medication Refill      ASSESSMENT/PLAN:  1. Intractable chronic migraine without aura and without status migrainosus  Discussed management of controlled substances including urine drug screen, controlled substances contract, and OARRS with each prescription. Discussed that initially patient stated she typically used 30 Fiorinal with codeine per year and this was her second request this year. Patient was unable to leave urine sample. -     External Referral To Neurology - Dr Dillon Parra. Patient would like to transfer care from [facility]. botox inj q3 months   -gets MRI q5y (mother had brain tumor glioblastoma, sister had brain aneurysm  -does toradol inj prn (~6/m)  -On Qulipta  -Tried Imitrex, rizatriptan, eletriptan, zolmitriptan, Ubrelvy, amitriptyline and gabapentin in the past per neurology note. Per patient tried Ajovy injections. States Fiorinal with Codeine is effective for very severe headaches. Uses Fiorinal withouto codeine somewhat regularly. We had discussion that she should try to further minimize use of these. . Tizanidine helps her neck pain. ---  Anxiety and depression  Is stable and well-controlled on current medications which include Wellbutrin and duloxetine  Hyperlipidemia, unspecified hyperlipidemia type  We discussed that she has an elevated HDL but also elevated LDL. Calcium score was 0. She has history of CVA. She takes a daily aspirin. She had recent brain MRI done at [facility] which I will try to obtain. Will decide whether to add a statin after I review it    Palpitations  Stable on Toprol     Psychophysiological insomnia  Seroquel is effective, also with Melatonin. She has been able to manage off benzos.   Allergic rhinitis  -uses Singulair, nasal Flovent  -hx of allergy shots      HO CVA - takes aspirin     No follow-ups on

## 2023-10-17 NOTE — PATIENT INSTRUCTIONS
Send me a direct message when need the fiorinal with codeine. And as we discussed you do have sufficient supplies of Fiorinal without codeine to last for a while.

## 2023-10-20 ENCOUNTER — TELEPHONE (OUTPATIENT)
Dept: ADMINISTRATIVE | Age: 53
End: 2023-10-20

## 2023-10-30 DIAGNOSIS — F41.9 ANXIETY AND DEPRESSION: ICD-10-CM

## 2023-10-30 DIAGNOSIS — G43.719 INTRACTABLE CHRONIC MIGRAINE WITHOUT AURA AND WITHOUT STATUS MIGRAINOSUS: Primary | ICD-10-CM

## 2023-10-30 DIAGNOSIS — F32.A ANXIETY AND DEPRESSION: ICD-10-CM

## 2023-10-30 NOTE — TELEPHONE ENCOUNTER
Last appointment: 10/17/2023  Next appointment: VISIT DATE NOT FOUND  Last refill: 9/29/23  Not sure when next appointment is due

## 2023-10-31 RX ORDER — QUETIAPINE FUMARATE 100 MG/1
TABLET, FILM COATED ORAL
Qty: 60 TABLET | Refills: 0 | Status: SHIPPED | OUTPATIENT
Start: 2023-10-31 | End: 2023-11-16

## 2023-11-01 NOTE — TELEPHONE ENCOUNTER
Let her know I did fill the Fiorinal w Codeine last time but I still need to have the UDS results on her chart as we discussed last visit.  Its not related to the Seroquel

## 2023-11-08 RX ORDER — BUPROPION HYDROCHLORIDE 150 MG/1
150 TABLET ORAL EVERY MORNING
Qty: 90 TABLET | Refills: 0 | Status: SHIPPED | OUTPATIENT
Start: 2023-11-08

## 2023-11-09 NOTE — TELEPHONE ENCOUNTER
Regarding pt's message, I did not say we'd get the UDS at next visit. I wanted it on the day of last visit but she was unable so we asked her to come back in the next few days to get that done. No plans for additional refills from me of Fiorinal with codeine in the future.

## 2023-11-16 RX ORDER — QUETIAPINE FUMARATE 100 MG/1
TABLET, FILM COATED ORAL
Qty: 60 TABLET | Refills: 0 | Status: SHIPPED | OUTPATIENT
Start: 2023-11-16

## 2023-11-16 NOTE — TELEPHONE ENCOUNTER
Last appointment: 10/17/2023  Next appointment: Visit date not found  Last refill: 10/31/2023      No return date given at last ov visit

## 2023-11-30 RX ORDER — DULOXETIN HYDROCHLORIDE 30 MG/1
30 CAPSULE, DELAYED RELEASE ORAL DAILY
Qty: 30 CAPSULE | Refills: 2 | Status: SHIPPED | OUTPATIENT
Start: 2023-11-30

## 2023-12-01 ENCOUNTER — TELEMEDICINE (OUTPATIENT)
Dept: INTERNAL MEDICINE CLINIC | Age: 53
End: 2023-12-01
Payer: COMMERCIAL

## 2023-12-01 ENCOUNTER — TELEPHONE (OUTPATIENT)
Dept: INTERNAL MEDICINE CLINIC | Age: 53
End: 2023-12-01

## 2023-12-01 DIAGNOSIS — U07.1 COVID-19: Primary | ICD-10-CM

## 2023-12-01 PROCEDURE — 99442 PR PHYS/QHP TELEPHONE EVALUATION 11-20 MIN: CPT | Performed by: INTERNAL MEDICINE

## 2023-12-01 NOTE — PROGRESS NOTES
Edmond Bird is a 52 y.o. female evaluated via telephone on 12/1/2023 for No chief complaint on file. .        Documentation:  I communicated with the patient and/or health care decision maker about Covid positive. Details of this discussion including any medical advice provided:   Tested positive yesterday. Symptoms started Tuesday with sore throat  Also has hoarseness, coughing, nasal congestion, diarrhea, chills, headache. Has not lost taste or had fever. Assessment / [address] sent to her pharmacy  Meds and labs reviewed for contraindications. Avoid qulipta and seroquel for 7 days and also best to avoid wellbutrin  Call if getting worse. Total Time: minutes: 11-20 minutes    Edmond Bird was evaluated through a synchronous (real-time) audio encounter. Patient identification was verified at the start of the visit. She (or guardian if applicable) is aware that this is a billable service, which includes applicable co-pays. This visit was conducted with the patient's (and/or legal guardian's) verbal consent. She has not had a related appointment within my department in the past 7 days or scheduled within the next 24 hours. The patient was located at Home: [address] [place] [address]. The provider was located at [facility] (Appt Dept): [address] [facility],  [address]     Note: not billable if this call serves to triage the patient into an appointment for the relevant concern        Jane Cai MD

## 2023-12-01 NOTE — TELEPHONE ENCOUNTER
Patient has tested Covid Positive yesterday. She has attempted to submit an evisit questionnaire but was directed to contact her Provider for care. Please advise. Symptoms began: 11/29/23  Fever greater than 100? No              Cough? Yes with mucus  SOB/Difficulty breathing? Yes * Chest Tightness  Fatigue? Yes  Muscle aches/Body aches? Yes  Headaches? Yes  Sore throat? Yes  Congestion? Yes  Nasal Drainage? No  Loss of smell or taste? No  Nausea, Vomiting or Diarrhea?  Diarrhea    Treating with Dayquil, Tylenol, Delsym

## 2023-12-05 RX ORDER — BENZONATATE 200 MG/1
200 CAPSULE ORAL 3 TIMES DAILY PRN
Qty: 30 CAPSULE | Refills: 0 | Status: SHIPPED | OUTPATIENT
Start: 2023-12-05 | End: 2023-12-15

## 2023-12-05 RX ORDER — ALBUTEROL SULFATE 90 UG/1
2 AEROSOL, METERED RESPIRATORY (INHALATION) 4 TIMES DAILY PRN
Qty: 18 G | Refills: 0 | Status: SHIPPED | OUTPATIENT
Start: 2023-12-05

## 2023-12-05 RX ORDER — PREDNISONE 20 MG/1
20 TABLET ORAL 2 TIMES DAILY
Qty: 10 TABLET | Refills: 0 | Status: SHIPPED | OUTPATIENT
Start: 2023-12-05 | End: 2023-12-10

## 2023-12-05 NOTE — TELEPHONE ENCOUNTER
Patient is calling regarding her recent Virtual Visit with Dr. Maddy Schultz. She is requesting something for her cough. She is experiencing an extreme violent cough that causes her to gag and become sick to her stomach. She states that she is unable to breath at times while coughing. The Delsym does not seem to be helping. Please Advise.

## 2024-02-09 RX ORDER — BUPROPION HYDROCHLORIDE 150 MG/1
150 TABLET ORAL EVERY MORNING
Qty: 90 TABLET | Refills: 0 | OUTPATIENT
Start: 2024-02-09

## 2024-02-09 NOTE — TELEPHONE ENCOUNTER
Last appointment: 10/17/2023  Next appointment: Visit date not found (Not listed in last note)  Last refill: 9/23/23

## 2024-02-12 RX ORDER — BUPROPION HYDROCHLORIDE 300 MG/1
300 TABLET ORAL EVERY MORNING
Qty: 90 TABLET | Refills: 0 | Status: SHIPPED | OUTPATIENT
Start: 2024-02-12

## 2024-02-16 RX ORDER — GABAPENTIN 300 MG/1
CAPSULE ORAL
COMMUNITY
Start: 2024-02-14

## 2024-03-13 ENCOUNTER — OFFICE VISIT (OUTPATIENT)
Dept: CARDIOLOGY CLINIC | Age: 54
End: 2024-03-13
Payer: COMMERCIAL

## 2024-03-13 VITALS
HEART RATE: 85 BPM | SYSTOLIC BLOOD PRESSURE: 120 MMHG | BODY MASS INDEX: 25.4 KG/M2 | DIASTOLIC BLOOD PRESSURE: 80 MMHG | WEIGHT: 148 LBS

## 2024-03-13 DIAGNOSIS — R00.2 PALPITATIONS: Primary | ICD-10-CM

## 2024-03-13 PROCEDURE — 99214 OFFICE O/P EST MOD 30 MIN: CPT | Performed by: INTERNAL MEDICINE

## 2024-03-13 NOTE — PROGRESS NOTES
Cc: palpitations    HPI:     Patient is a 53-year-old woman with history of migraines (currently on multiple medication), \"right eye stroke\" (normal MRI brain ), palpitations, anxiety, depression.     Patient started complaining of racing heartbeats the last month, worse over the last 2 weeks.  The last couple of weeks he has also had chest discomfort with palpitations.  Otherwise he is quite active at home and likes to exercise, does spinning 30 minutes 2 times per week and walks the dogs, without any symptoms.  Patient was seen at  emergency room on 2022 and her work-up was within normal limits.  Her TSH was normal.     ECG 2022: NSR, normal ECG.     CAM  - 2022: Patient reports symptoms during the first day of wearing the monitor.  There were no tachyarrhythmias or ectopies.     Echo 2022: Normal     Patient is here for follow-up.  She was started on Toprol 25 mg daily in 2023 which was increased to bid in 2023.  Since then her palpitations have completely resolved and her anxiety significantly improved.  Her BP and heart rate at home are within normal limits.      Histories     Past Medical History:   has a past medical history of Allergic rhinitis, cause unspecified, Anxiety state, unspecified, Cancer (HCC), CVA (cerebral vascular accident) (HCC), Endometriosis, Ganglion cyst, HA (headache), Hemorrhage of rectum and anus, Insomnia, unspecified, Migraine, unspecified, without mention of intractable migraine without mention of status migrainosus, Osteopenia, Screening mammogram, Sprain of neck, Unspecified constipation, and Unspecified thrombosed hemorrhoids.    Surgical History:   has a past surgical history that includes Colonoscopy (2008); Nasal septum surgery (1993); laparoscopy (2005);  section (1990); Carpal tunnel release; Cervical spine surgery (); Finger trigger release (Right, 2022); and Colonoscopy (2023).     Social

## 2024-03-14 RX ORDER — QUETIAPINE FUMARATE 100 MG/1
TABLET, FILM COATED ORAL
Qty: 60 TABLET | Refills: 2 | Status: SHIPPED | OUTPATIENT
Start: 2024-03-14

## 2024-03-27 ENCOUNTER — OFFICE VISIT (OUTPATIENT)
Dept: INTERNAL MEDICINE CLINIC | Age: 54
End: 2024-03-27
Payer: COMMERCIAL

## 2024-03-27 VITALS
HEIGHT: 64 IN | DIASTOLIC BLOOD PRESSURE: 80 MMHG | WEIGHT: 151.6 LBS | SYSTOLIC BLOOD PRESSURE: 130 MMHG | HEART RATE: 79 BPM | BODY MASS INDEX: 25.88 KG/M2 | OXYGEN SATURATION: 97 %

## 2024-03-27 DIAGNOSIS — E78.5 HYPERLIPIDEMIA, UNSPECIFIED HYPERLIPIDEMIA TYPE: ICD-10-CM

## 2024-03-27 DIAGNOSIS — Z01.818 PREOP EXAMINATION: ICD-10-CM

## 2024-03-27 DIAGNOSIS — Z01.818 PREOP EXAMINATION: Primary | ICD-10-CM

## 2024-03-27 PROCEDURE — 99243 OFF/OP CNSLTJ NEW/EST LOW 30: CPT | Performed by: INTERNAL MEDICINE

## 2024-03-27 PROCEDURE — 93000 ELECTROCARDIOGRAM COMPLETE: CPT | Performed by: INTERNAL MEDICINE

## 2024-03-27 RX ORDER — RIMEGEPANT SULFATE 75 MG/75MG
75 TABLET, ORALLY DISINTEGRATING ORAL
COMMUNITY

## 2024-03-27 RX ORDER — KETOROLAC TROMETHAMINE 30 MG/ML
INJECTION, SOLUTION INTRAMUSCULAR; INTRAVENOUS
COMMUNITY
Start: 2024-03-19

## 2024-03-27 SDOH — ECONOMIC STABILITY: FOOD INSECURITY: WITHIN THE PAST 12 MONTHS, THE FOOD YOU BOUGHT JUST DIDN'T LAST AND YOU DIDN'T HAVE MONEY TO GET MORE.: NEVER TRUE

## 2024-03-27 SDOH — ECONOMIC STABILITY: FOOD INSECURITY: WITHIN THE PAST 12 MONTHS, YOU WORRIED THAT YOUR FOOD WOULD RUN OUT BEFORE YOU GOT MONEY TO BUY MORE.: NEVER TRUE

## 2024-03-27 SDOH — ECONOMIC STABILITY: INCOME INSECURITY: HOW HARD IS IT FOR YOU TO PAY FOR THE VERY BASICS LIKE FOOD, HOUSING, MEDICAL CARE, AND HEATING?: NOT HARD AT ALL

## 2024-03-27 ASSESSMENT — PATIENT HEALTH QUESTIONNAIRE - PHQ9
SUM OF ALL RESPONSES TO PHQ QUESTIONS 1-9: 0
SUM OF ALL RESPONSES TO PHQ QUESTIONS 1-9: 0
4. FEELING TIRED OR HAVING LITTLE ENERGY: NOT AT ALL
3. TROUBLE FALLING OR STAYING ASLEEP: NOT AT ALL
6. FEELING BAD ABOUT YOURSELF - OR THAT YOU ARE A FAILURE OR HAVE LET YOURSELF OR YOUR FAMILY DOWN: NOT AT ALL
SUM OF ALL RESPONSES TO PHQ QUESTIONS 1-9: 0
5. POOR APPETITE OR OVEREATING: NOT AT ALL
1. LITTLE INTEREST OR PLEASURE IN DOING THINGS: NOT AT ALL
2. FEELING DOWN, DEPRESSED OR HOPELESS: NOT AT ALL
7. TROUBLE CONCENTRATING ON THINGS, SUCH AS READING THE NEWSPAPER OR WATCHING TELEVISION: NOT AT ALL
9. THOUGHTS THAT YOU WOULD BE BETTER OFF DEAD, OR OF HURTING YOURSELF: NOT AT ALL
SUM OF ALL RESPONSES TO PHQ QUESTIONS 1-9: 0
10. IF YOU CHECKED OFF ANY PROBLEMS, HOW DIFFICULT HAVE THESE PROBLEMS MADE IT FOR YOU TO DO YOUR WORK, TAKE CARE OF THINGS AT HOME, OR GET ALONG WITH OTHER PEOPLE: NOT DIFFICULT AT ALL
SUM OF ALL RESPONSES TO PHQ9 QUESTIONS 1 & 2: 0
8. MOVING OR SPEAKING SO SLOWLY THAT OTHER PEOPLE COULD HAVE NOTICED. OR THE OPPOSITE, BEING SO FIGETY OR RESTLESS THAT YOU HAVE BEEN MOVING AROUND A LOT MORE THAN USUAL: NOT AT ALL

## 2024-03-27 ASSESSMENT — ENCOUNTER SYMPTOMS
ABDOMINAL PAIN: 0
SORE THROAT: 0
RHINORRHEA: 0
TROUBLE SWALLOWING: 0
SHORTNESS OF BREATH: 0
COUGH: 0
NAUSEA: 0
DIARRHEA: 0

## 2024-03-27 NOTE — PROGRESS NOTES
Preoperative Consultation      Christianne Chávez  YOB: 1970    Date of Service:  3/27/2024    Vitals:    24 1131 24 1202   BP: 136/88 130/80   Pulse: 79    SpO2: 97%    Weight: 68.8 kg (151 lb 9.6 oz)    Height: 1.626 m (5' 4\")            Chief Complaint   Patient presents with    Pre-op Exam     2024, right breast lumpectomy done at [facility]       HPI:    This patient presents to the office today for a preoperative consultation at the request of surgeon, Dr. Nunn, who plans on performing right breast excisional biopsy on April 3 at [facility].      Planned anesthesia: MAC   Known anesthesia problems: None except post operative nausea one time  Bleeding risk: No recent or remote history of abnormal bleeding  Personal or FH of DVT/PE: None. History of retinal artery occlusion  Recent steroid use: no  Exercise tolerance: greater than 4 METs   Patient objection to receiving blood products: No      Past Medical History:   Diagnosis Date    Allergic rhinitis, cause unspecified     Chronic    Anxiety state, unspecified     Cancer (HCC)     basal under left eye    CVA (cerebral vascular accident) (HCC)     retinal artery occlusion, takes aspirin - right eye - field of vision affected    Endometriosis 2005    Ganglion cyst 2009    Hemorrhage of rectum and anus     hemorrhoid    Insomnia, unspecified     Migraine, unspecified, without mention of intractable migraine without mention of status migrainosus     Chronic    Osteopenia     PAC (premature atrial contraction)     Screening mammogram 2009    Benign    Sprain of neck     Bilateral     Past Surgical History:   Procedure Laterality Date    CARPAL TUNNEL RELEASE      CERVICAL SPINE SURGERY      c5-6     SECTION  1990    COLONOSCOPY  2008    COLONOSCOPY  2023    fu 10 yrs?    FINGER TRIGGER RELEASE Right 2022    RIGHT LONG FINGER A1 PULLEY RELEASE

## 2024-03-28 ENCOUNTER — TELEPHONE (OUTPATIENT)
Dept: INTERNAL MEDICINE CLINIC | Age: 54
End: 2024-03-28

## 2024-03-28 LAB
ABO + RH BLD: NORMAL
ALBUMIN SERPL-MCNC: 5 G/DL (ref 3.4–5)
ALBUMIN/GLOB SERPL: 2.3 {RATIO} (ref 1.1–2.2)
ALP SERPL-CCNC: 86 U/L (ref 40–129)
ALT SERPL-CCNC: 15 U/L (ref 10–40)
ANION GAP SERPL CALCULATED.3IONS-SCNC: 10 MMOL/L (ref 3–16)
AST SERPL-CCNC: 20 U/L (ref 15–37)
BILIRUB SERPL-MCNC: <0.2 MG/DL (ref 0–1)
BUN SERPL-MCNC: 11 MG/DL (ref 7–20)
CALCIUM SERPL-MCNC: 9.9 MG/DL (ref 8.3–10.6)
CHLORIDE SERPL-SCNC: 101 MMOL/L (ref 99–110)
CHOLEST SERPL-MCNC: 248 MG/DL (ref 0–199)
CO2 SERPL-SCNC: 29 MMOL/L (ref 21–32)
CREAT SERPL-MCNC: 0.7 MG/DL (ref 0.6–1.1)
DEPRECATED RDW RBC AUTO: 14.4 % (ref 12.4–15.4)
GFR SERPLBLD CREATININE-BSD FMLA CKD-EPI: >90 ML/MIN/{1.73_M2}
GLUCOSE SERPL-MCNC: 92 MG/DL (ref 70–99)
HCT VFR BLD AUTO: 37.1 % (ref 36–48)
HDLC SERPL-MCNC: 71 MG/DL (ref 40–60)
HGB BLD-MCNC: 12.7 G/DL (ref 12–16)
LDLC SERPL CALC-MCNC: 157 MG/DL
MCH RBC QN AUTO: 30.8 PG (ref 26–34)
MCHC RBC AUTO-ENTMCNC: 34.2 G/DL (ref 31–36)
MCV RBC AUTO: 90.1 FL (ref 80–100)
PLATELET # BLD AUTO: 277 K/UL (ref 135–450)
PMV BLD AUTO: 8.3 FL (ref 5–10.5)
POTASSIUM SERPL-SCNC: 4.6 MMOL/L (ref 3.5–5.1)
PROT SERPL-MCNC: 7.2 G/DL (ref 6.4–8.2)
RBC # BLD AUTO: 4.11 M/UL (ref 4–5.2)
SODIUM SERPL-SCNC: 140 MMOL/L (ref 136–145)
TRIGL SERPL-MCNC: 102 MG/DL (ref 0–150)
VLDLC SERPL CALC-MCNC: 20 MG/DL
WBC # BLD AUTO: 5.7 K/UL (ref 4–11)

## 2024-03-28 NOTE — TELEPHONE ENCOUNTER
Jeanne with [facility] is requesting we fax over Pre-op H&P and EKG from 3/27 visit to fax#:  [phone].  Any questions/concerns please call  her back at number provided.

## 2024-04-10 RX ORDER — BUPROPION HYDROCHLORIDE 300 MG/1
300 TABLET ORAL EVERY MORNING
Qty: 90 TABLET | Refills: 0 | Status: SHIPPED | OUTPATIENT
Start: 2024-04-10

## 2024-04-29 ENCOUNTER — TELEPHONE (OUTPATIENT)
Dept: ADMINISTRATIVE | Age: 54
End: 2024-04-29

## 2024-04-29 RX ORDER — DULOXETIN HYDROCHLORIDE 30 MG/1
30 CAPSULE, DELAYED RELEASE ORAL DAILY
Qty: 30 CAPSULE | Refills: 2 | Status: SHIPPED | OUTPATIENT
Start: 2024-04-29

## 2024-04-29 NOTE — TELEPHONE ENCOUNTER
Submitted PA for DULOXETINE 30MG   Via [facility]: [id number] STATUS: APPROVAL    Drug is covered by current benefit plan. No  PA needed     If this requires a response please respond to the pool. ([facility] Pre-Auth).    Please advise patient thank you.     no

## 2024-04-29 NOTE — TELEPHONE ENCOUNTER
Medication:   Requested Prescriptions     Pending Prescriptions Disp Refills    DULoxetine (CYMBALTA) 30 MG extended release capsule 30 capsule 2     Sig: Take 1 capsule by mouth daily        Last Filled:      Patient Phone Number: [phone] (home)     Last appt: 3/27/2024   Next appt: 7/17/2024    Last OARRS:       1/5/2023     1:54 PM   RX Monitoring   Periodic Controlled Substance Monitoring Obtaining appropriate analgesic effect of treatment.

## 2024-05-17 ENCOUNTER — HOSPITAL ENCOUNTER (OUTPATIENT)
Dept: GENERAL RADIOLOGY | Age: 54
Discharge: HOME | End: 2024-05-17
Attending: OBSTETRICS & GYNECOLOGY
Payer: COMMERCIAL

## 2024-05-17 DIAGNOSIS — Z13.820 SPECIAL SCREENING FOR OSTEOPOROSIS: ICD-10-CM

## 2024-05-17 DIAGNOSIS — Z78.0 MENOPAUSE: ICD-10-CM

## 2024-05-17 PROCEDURE — 77080 DXA BONE DENSITY AXIAL: CPT

## 2024-05-17 RX ORDER — DULOXETIN HYDROCHLORIDE 30 MG/1
CAPSULE, DELAYED RELEASE ORAL
Refills: 0 | OUTPATIENT
Start: 2024-05-17

## 2024-05-17 RX ORDER — DULOXETIN HYDROCHLORIDE 30 MG/1
30 CAPSULE, DELAYED RELEASE ORAL DAILY
Qty: 90 CAPSULE | Refills: 0 | Status: SHIPPED | OUTPATIENT
Start: 2024-05-17

## 2024-05-17 RX ORDER — METOPROLOL SUCCINATE 25 MG/1
25 TABLET, EXTENDED RELEASE ORAL 2 TIMES DAILY
Qty: 180 TABLET | Refills: 3 | Status: SHIPPED | OUTPATIENT
Start: 2024-05-17 | End: 2025-05-12

## 2024-05-17 RX ORDER — BUPROPION HYDROCHLORIDE 300 MG/1
300 TABLET ORAL EVERY MORNING
Qty: 90 TABLET | Refills: 0 | Status: SHIPPED | OUTPATIENT
Start: 2024-05-17

## 2024-05-17 NOTE — TELEPHONE ENCOUNTER
Requested Prescriptions     Pending Prescriptions Disp Refills    metoprolol succinate (TOPROL XL) 25 MG extended release tablet [Pharmacy Med Name: METOPROLOL SUCCINATE ER TABS 25MG]  0            Last Office Visit: 3/13/2024     Next Office Visit: Visit date not found

## 2024-07-09 RX ORDER — QUETIAPINE FUMARATE 100 MG/1
TABLET, FILM COATED ORAL
Qty: 60 TABLET | Refills: 2 | Status: SHIPPED | OUTPATIENT
Start: 2024-07-09

## 2024-07-10 ENCOUNTER — PATIENT MESSAGE (OUTPATIENT)
Dept: INTERNAL MEDICINE CLINIC | Age: 54
End: 2024-07-10

## 2024-07-11 RX ORDER — BUTALBITAL, ACETAMINOPHEN AND CAFFEINE 50; 325; 40 MG/1; MG/1; MG/1
1 TABLET ORAL EVERY 6 HOURS PRN
Qty: 12 TABLET | Refills: 0 | Status: SHIPPED | OUTPATIENT
Start: 2024-07-11 | End: 2024-07-14

## 2024-07-11 NOTE — TELEPHONE ENCOUNTER
From: Christianne Chávez  To: Dr. Norma Guzman  Sent: 7/10/2024 2:34 PM EDT  Subject: Medication refill request that is not listed     Hi    My pharmacy is reaching out to you for a refill of my Fiorinal not with codeine just the regular one. Can you please see if Dr Guzman will approve? I do have an appointment with her I think next week. Thanks.     Christianne Chávez

## 2024-07-12 NOTE — TELEPHONE ENCOUNTER
Sending limited supply and will discuss 7/17 that her headache specialist should manage all of her headache medications going forward.

## 2024-07-17 ENCOUNTER — OFFICE VISIT (OUTPATIENT)
Dept: INTERNAL MEDICINE CLINIC | Age: 54
End: 2024-07-17
Payer: COMMERCIAL

## 2024-07-17 VITALS
DIASTOLIC BLOOD PRESSURE: 80 MMHG | OXYGEN SATURATION: 99 % | WEIGHT: 159 LBS | SYSTOLIC BLOOD PRESSURE: 138 MMHG | BODY MASS INDEX: 27.29 KG/M2 | HEART RATE: 59 BPM

## 2024-07-17 DIAGNOSIS — E78.5 HYPERLIPIDEMIA, UNSPECIFIED HYPERLIPIDEMIA TYPE: ICD-10-CM

## 2024-07-17 DIAGNOSIS — Z00.00 ENCOUNTER FOR WELL ADULT EXAM WITHOUT ABNORMAL FINDINGS: Primary | ICD-10-CM

## 2024-07-17 DIAGNOSIS — Z11.59 SCREENING FOR VIRAL DISEASE: ICD-10-CM

## 2024-07-17 PROCEDURE — 99396 PREV VISIT EST AGE 40-64: CPT | Performed by: INTERNAL MEDICINE

## 2024-07-17 RX ORDER — TIZANIDINE 4 MG/1
TABLET ORAL
COMMUNITY
Start: 2024-07-01

## 2024-07-17 RX ORDER — ROSUVASTATIN CALCIUM 10 MG/1
10 TABLET, COATED ORAL NIGHTLY
Qty: 30 TABLET | Refills: 3 | Status: SHIPPED | OUTPATIENT
Start: 2024-07-17

## 2024-07-17 NOTE — PATIENT INSTRUCTIONS
Add Allegra 180 mg  during \"allergy seasons\"   try to reduce seroquel and this may help you lose weight    [facility]  Due in 9/24 -double check that    Start crestor  Labs in 3 months fasting    Ct cardiac   [id number]  Proscan 99$    Check home bp  Well Visit, Ages 18 to 65: Care Instructions  Well visits can help you stay healthy. Your doctor has checked your overall health and may have suggested ways to take good care of yourself. Your doctor also may have recommended tests. You can help prevent illness with healthy eating, good sleep, vaccinations, regular exercise, and other steps.    Get the tests that you and your doctor decide on. Depending on your age and risks, examples might include screening for diabetes; hepatitis C; HIV; and cervical, breast, lung, and colon cancer. Screening helps find diseases before any symptoms appear.   Eat healthy foods. Choose fruits, vegetables, whole grains, lean protein, and low-fat dairy foods. Limit saturated fat and reduce salt.     Limit alcohol. Men should have no more than 2 drinks a day. Women should have no more than 1. For some people, no alcohol is the best choice.   Exercise. Get at least 30 minutes of exercise on most days of the week. Walking can be a good choice.     Reach and stay at your healthy weight. This will lower your risk for many health problems.   Take care of your mental health. Try to stay connected with friends, family, and community, and find ways to manage stress.     If you're feeling depressed or hopeless, talk to someone. A counselor can help. If you don't have a counselor, talk to your doctor.   Talk to your doctor if you think you may have a problem with alcohol or drug use. This includes prescription medicines, marijuana, and other drugs.     Avoid tobacco and nicotine: Don't smoke, vape, or chew. If you need help quitting, talk to your doctor.   Practice safer sex. Getting tested, using condoms or dental dams, and limiting sex

## 2024-07-24 ENCOUNTER — TELEPHONE (OUTPATIENT)
Dept: INTERNAL MEDICINE CLINIC | Age: 54
End: 2024-07-24

## 2024-07-24 NOTE — TELEPHONE ENCOUNTER
----- Message from Norma Guzman MD sent at 7/23/2024 11:49 PM EDT -----  Please advise patient she can cancel the CT cardiac scheduled for today/July 24 at 3:30 since on chart review she has already had this test done and it was normal last year.

## 2024-07-24 NOTE — TELEPHONE ENCOUNTER
I called patient and she stated this is great news because she has a headache and she did not want to go. She gave verbal understanding.

## 2024-08-20 ENCOUNTER — PATIENT MESSAGE (OUTPATIENT)
Dept: INTERNAL MEDICINE CLINIC | Age: 54
End: 2024-08-20

## 2024-08-20 DIAGNOSIS — G43.711 INTRACTABLE CHRONIC MIGRAINE WITHOUT AURA AND WITH STATUS MIGRAINOSUS: Primary | ICD-10-CM

## 2024-08-20 RX ORDER — BUTALBITAL, ASPIRIN, AND CAFFEINE 325; 50; 40 MG/1; MG/1; MG/1
CAPSULE ORAL
COMMUNITY
Start: 2004-01-10

## 2024-08-20 NOTE — TELEPHONE ENCOUNTER
I called patient and yes she gave verbal understanding on the whole message and she sees her neurologist early next week and she is going to discuss more information with them as she is now feeling a little dizzy sometimes.     June was a perfect month for her headache wise [place]. She stated July was hell for her due to rain here. She stated rain is a trigger for her.     She would like this prescription to be filled as she stated she just took her last pill.

## 2024-08-20 NOTE — TELEPHONE ENCOUNTER
Tell pt that in my note I documented:  \"We agreed that she can have a prescription of 30 Fiorinal With Codeine per year for very severe headaches.\"  Is that her understanding?  She did receive 20 of these in April.  Let her know that if I send a prescription today, there will not be another prescription for 1 year.  She should continue to work with her neurologist to reduce or eliminate the need for this medication.

## 2024-08-21 RX ORDER — CODEINE/BUTALBITAL/ASA/CAFFEIN 30-50-325
1 CAPSULE ORAL EVERY 4 HOURS PRN
Qty: 180 CAPSULE | Refills: 0 | Status: SHIPPED | OUTPATIENT
Start: 2024-08-21 | End: 2024-09-20

## 2024-09-12 RX ORDER — DULOXETIN HYDROCHLORIDE 30 MG/1
30 CAPSULE, DELAYED RELEASE ORAL DAILY
Qty: 30 CAPSULE | Refills: 0 | Status: SHIPPED | OUTPATIENT
Start: 2024-09-12

## 2024-09-20 RX ORDER — QUETIAPINE FUMARATE 100 MG/1
TABLET, FILM COATED ORAL
Qty: 60 TABLET | Refills: 1 | Status: SHIPPED | OUTPATIENT
Start: 2024-09-20

## 2024-10-08 RX ORDER — ROSUVASTATIN CALCIUM 10 MG/1
10 TABLET, COATED ORAL NIGHTLY
Qty: 30 TABLET | Refills: 3 | Status: SHIPPED | OUTPATIENT
Start: 2024-10-08

## 2024-10-08 RX ORDER — BUPROPION HYDROCHLORIDE 300 MG/1
300 TABLET ORAL EVERY MORNING
Qty: 90 TABLET | Refills: 0 | Status: SHIPPED | OUTPATIENT
Start: 2024-10-08

## 2024-10-08 NOTE — TELEPHONE ENCOUNTER
Medication:   Requested Prescriptions     Pending Prescriptions Disp Refills    rosuvastatin (CRESTOR) 10 MG tablet [Pharmacy Med Name: ROSUVASTATIN 10MG TABLETS] 30 tablet 3     Sig: TAKE 1 TABLET BY MOUTH EVERY NIGHT    buPROPion (WELLBUTRIN XL) 300 MG extended release tablet [Pharmacy Med Name: BUPROPION XL 300MG TABLETS] 90 tablet 0     Sig: TAKE 1 TABLET BY MOUTH EVERY MORNING     Last Filled:  crestor 07/17/24, bupropion 05/17/24    Last appt: 7/17/2024   Next appt: 11/13/2024    Last Lipid:   Lab Results   Component Value Date/Time    CHOL 248 03/27/2024 12:20 PM    TRIG 102 03/27/2024 12:20 PM    HDL 71 03/27/2024 12:20 PM    HDL 55 03/16/2012 12:52 PM

## 2024-11-29 RX ORDER — DULOXETIN HYDROCHLORIDE 30 MG/1
30 CAPSULE, DELAYED RELEASE ORAL DAILY
Qty: 30 CAPSULE | Refills: 0 | Status: SHIPPED | OUTPATIENT
Start: 2024-11-29

## 2024-12-07 LAB
ALBUMIN: 4.6 G/DL
ALP BLD-CCNC: 88 U/L
ALT SERPL-CCNC: 26 U/L
ANION GAP SERPL CALCULATED.3IONS-SCNC: NORMAL MMOL/L
AST SERPL-CCNC: 29 U/L
BILIRUB SERPL-MCNC: 0.2 MG/DL (ref 0.1–1.4)
BUN BLDV-MCNC: 17 MG/DL
CALCIUM SERPL-MCNC: 9.5 MG/DL
CHLORIDE BLD-SCNC: 102 MMOL/L
CHOLESTEROL, TOTAL: 172 MG/DL
CHOLESTEROL/HDL RATIO: NORMAL
CO2: 25 MMOL/L
CREAT SERPL-MCNC: 0.92 MG/DL
GFR, ESTIMATED: 73
GLUCOSE BLD-MCNC: 97 MG/DL
HDLC SERPL-MCNC: 69 MG/DL (ref 35–70)
LDL CHOLESTEROL: 91
NONHDLC SERPL-MCNC: NORMAL MG/DL
POTASSIUM SERPL-SCNC: 4.8 MMOL/L
SODIUM BLD-SCNC: 141 MMOL/L
TOTAL PROTEIN: 6.9 G/DL (ref 6.4–8.2)
TRIGL SERPL-MCNC: 63 MG/DL
VLDLC SERPL CALC-MCNC: 12 MG/DL

## 2024-12-09 RX ORDER — METOPROLOL SUCCINATE 25 MG/1
25 TABLET, EXTENDED RELEASE ORAL 2 TIMES DAILY
Qty: 180 TABLET | Refills: 3 | OUTPATIENT
Start: 2024-12-09

## 2024-12-09 RX ORDER — METOPROLOL SUCCINATE 25 MG/1
25 TABLET, EXTENDED RELEASE ORAL 2 TIMES DAILY
Qty: 180 TABLET | Refills: 3 | Status: SHIPPED | OUTPATIENT
Start: 2024-12-09 | End: 2025-12-04

## 2024-12-09 NOTE — TELEPHONE ENCOUNTER
Requested Prescriptions     Pending Prescriptions Disp Refills    metoprolol succinate (TOPROL XL) 25 MG extended release tablet [Pharmacy Med Name: METOPROLOL ER SUCCINATE 25MG TABS] 180 tablet 3     Sig: TAKE 1 TABLET BY MOUTH TWICE DAILY            Checked Correct Pharmacy: Yes    Any changes since last refill? No     Number: 180    Refills: 3    Last Office Visit: 3/13/2024     Next Office Visit: 3/12/2025

## 2024-12-09 NOTE — TELEPHONE ENCOUNTER
Requested Prescriptions     Pending Prescriptions Disp Refills    metoprolol succinate (TOPROL XL) 25 MG extended release tablet [Pharmacy Med Name: METOPROLOL ER SUCCINATE 25MG TABS] 180 tablet 3     Sig: TAKE 1 TABLET BY MOUTH TWICE DAILY            Checked Correct Pharmacy: Yes    Any changes since last refill? No     Number: 180    Refills: 3    Last Office Visit: 3/13/2024     Next Office Visit: 3/12/2025       Last Labs: 05.03.2024

## 2024-12-09 NOTE — TELEPHONE ENCOUNTER
Pt called back stating the pharmacy called her and told her that the medication was denied from provider. She is not sure why

## 2024-12-09 NOTE — TELEPHONE ENCOUNTER
Pt called stating she is completely out of Metoprolol and will need filled asap.    Medication  metoprolol succinate (TOPROL XL) 25 MG extended release tablet [[id number]]  metoprolol succinate (TOPROL XL) 25 MG extended release tablet [[id number]]    Order Details  Dose: 25 mg Route: Oral Frequency: 2 TIMES DAILY   Dispense Quantity: 180 tablet Refills: 3          Sig: Take 1 tablet by mouth 2 times daily         Pharmacy    [id number] - [place] - [address] - P [phone] - F [phone] [address] [place] [address] [place] [address]  Phone: [phone]  Fax: [phone]

## 2025-01-06 RX ORDER — DULOXETIN HYDROCHLORIDE 30 MG/1
30 CAPSULE, DELAYED RELEASE ORAL DAILY
Qty: 30 CAPSULE | Refills: 0 | Status: SHIPPED | OUTPATIENT
Start: 2025-01-06

## 2025-01-06 NOTE — TELEPHONE ENCOUNTER
Last appointment: 7/17/2024  Next appointment: 1/9/2025  Last refill:   Last ordered: 1 month ago (11/29/2024) by Norma Guzman MD           Requested Prescriptions     Pending Prescriptions Disp Refills    DULoxetine (CYMBALTA) 30 MG extended release capsule [Pharmacy Med Name: DULOXETINE DR 30MG CAPSULES] 30 capsule 0     Sig: TAKE 1 CAPSULE BY MOUTH DAILY

## 2025-01-08 NOTE — PROGRESS NOTES
Christianne Chávez (:  1970) is a 54 y.o. female,Established patient, here for evaluation of the following chief complaint(s): Discuss Labs and Weight Loss      ASSESSMENT/PLAN:  1. Anxiety and depression  Well-controlled on Wellbutrin and duloxetine.  -     buPROPion (WELLBUTRIN XL) 150 MG extended release tablet; Take 1 tablet by mouth every morning, Disp-90 tablet, [id number] refills remain on this prescription. Your patient is requesting advance approval of refills for this medication to PREVENT ANY MISSED DOSESNormal  2. Hyperlipidemia, unspecified hyperlipidemia type  Stable on statin  3. Abnormal weight gain  No cause found with labs.  Likely related to menopause years.  Discussed diet and exercise.  4. Intractable chronic migraine without aura and without status migrainosus  As per Dr Huber. On Fiorinal and Toradol  Palpitations  Stable on Toprol  Psychophysiological insomnia  Seroquel is effective, also with Melatonin. She has been able to manage off benzos.  Advised trying to reduce dose of Seroquel to also help with losing weight  Allergic rhinitis  -uses Singulair, nasal Flovent. Allegra during allergy seasons  -hx of allergy shots   HO CVA - takes aspirin,  statin  Neck  pain  Takes Gabapentin  Return in about 6 months (around 2025).    SUBJECTIVE/OBJECTIVE:  HPI  Patient had recent blood work done which we reviewed.  It was overall normal.  She is somewhat frustrated by her weight.  She is trying to cut back on alcohol.    Depression is overall doing better.  Migraines are under reasonable control.  Continues to see neurology.        Review of Systems        2025    12:25 PM   Patient-Reported Vitals   Patient-Reported Weight 168   Patient-Reported Height 5’4”   Patient-Reported Systolic 120 mmHg   Patient-Reported Diastolic 82 mmHg   Patient-Reported Pulse 79   Patient-Reported Temperature 98   Patient-Reported Peak Flow No         Past Medical History:   Diagnosis Date    Allergic rhinitis,

## 2025-01-09 ENCOUNTER — TELEMEDICINE (OUTPATIENT)
Dept: INTERNAL MEDICINE CLINIC | Age: 55
End: 2025-01-09

## 2025-01-09 ENCOUNTER — TELEPHONE (OUTPATIENT)
Dept: INTERNAL MEDICINE CLINIC | Age: 55
End: 2025-01-09

## 2025-01-09 DIAGNOSIS — R63.5 ABNORMAL WEIGHT GAIN: ICD-10-CM

## 2025-01-09 DIAGNOSIS — G43.719 INTRACTABLE CHRONIC MIGRAINE WITHOUT AURA AND WITHOUT STATUS MIGRAINOSUS: ICD-10-CM

## 2025-01-09 DIAGNOSIS — E78.5 HYPERLIPIDEMIA, UNSPECIFIED HYPERLIPIDEMIA TYPE: ICD-10-CM

## 2025-01-09 DIAGNOSIS — F41.9 ANXIETY AND DEPRESSION: Primary | ICD-10-CM

## 2025-01-09 DIAGNOSIS — F32.A ANXIETY AND DEPRESSION: Primary | ICD-10-CM

## 2025-01-09 RX ORDER — BUPROPION HYDROCHLORIDE 150 MG/1
150 TABLET ORAL EVERY MORNING
Qty: 90 TABLET | Refills: 1 | Status: SHIPPED | OUTPATIENT
Start: 2025-01-09 | End: 2025-07-08

## 2025-01-09 SDOH — ECONOMIC STABILITY: FOOD INSECURITY: WITHIN THE PAST 12 MONTHS, YOU WORRIED THAT YOUR FOOD WOULD RUN OUT BEFORE YOU GOT MONEY TO BUY MORE.: NEVER TRUE

## 2025-01-09 SDOH — ECONOMIC STABILITY: FOOD INSECURITY: WITHIN THE PAST 12 MONTHS, THE FOOD YOU BOUGHT JUST DIDN'T LAST AND YOU DIDN'T HAVE MONEY TO GET MORE.: NEVER TRUE

## 2025-01-09 ASSESSMENT — PATIENT HEALTH QUESTIONNAIRE - PHQ9
9. THOUGHTS THAT YOU WOULD BE BETTER OFF DEAD, OR OF HURTING YOURSELF: NOT AT ALL
4. FEELING TIRED OR HAVING LITTLE ENERGY: NOT AT ALL
7. TROUBLE CONCENTRATING ON THINGS, SUCH AS READING THE NEWSPAPER OR WATCHING TELEVISION: NOT AT ALL
SUM OF ALL RESPONSES TO PHQ QUESTIONS 1-9: 0
1. LITTLE INTEREST OR PLEASURE IN DOING THINGS: NOT AT ALL
2. FEELING DOWN, DEPRESSED OR HOPELESS: NOT AT ALL
SUM OF ALL RESPONSES TO PHQ9 QUESTIONS 1 & 2: 0
SUM OF ALL RESPONSES TO PHQ QUESTIONS 1-9: 0
SUM OF ALL RESPONSES TO PHQ QUESTIONS 1-9: 0
3. TROUBLE FALLING OR STAYING ASLEEP: NOT AT ALL
10. IF YOU CHECKED OFF ANY PROBLEMS, HOW DIFFICULT HAVE THESE PROBLEMS MADE IT FOR YOU TO DO YOUR WORK, TAKE CARE OF THINGS AT HOME, OR GET ALONG WITH OTHER PEOPLE: NOT DIFFICULT AT ALL
8. MOVING OR SPEAKING SO SLOWLY THAT OTHER PEOPLE COULD HAVE NOTICED. OR THE OPPOSITE, BEING SO FIGETY OR RESTLESS THAT YOU HAVE BEEN MOVING AROUND A LOT MORE THAN USUAL: NOT AT ALL
6. FEELING BAD ABOUT YOURSELF - OR THAT YOU ARE A FAILURE OR HAVE LET YOURSELF OR YOUR FAMILY DOWN: NOT AT ALL
5. POOR APPETITE OR OVEREATING: NOT AT ALL
SUM OF ALL RESPONSES TO PHQ QUESTIONS 1-9: 0

## 2025-01-09 NOTE — TELEPHONE ENCOUNTER
----- Message from Dr. Norma Guzman MD sent at 1/9/2025  4:00 PM EST -----  Get her labs - from [facility]

## 2025-01-09 NOTE — TELEPHONE ENCOUNTER
Spoke to [facility] and the most recent labs are from 12.6.24 , they will be faxing them over now

## 2025-01-27 RX ORDER — QUETIAPINE FUMARATE 100 MG/1
TABLET, FILM COATED ORAL
Qty: 60 TABLET | Refills: 1 | Status: SHIPPED | OUTPATIENT
Start: 2025-01-27

## 2025-01-27 NOTE — TELEPHONE ENCOUNTER
Last appointment: 1/9/2025  Next appointment: Visit date not found    Return in about 6 months (around 7/9/2025).  Sent [email or web address] message to schedule next appointment due in July.    Last refill: 9/20/24

## 2025-01-28 NOTE — TELEPHONE ENCOUNTER
I just called and spoke to patient as it was easier and we got her on the books for a physical per her request.

## 2025-02-07 RX ORDER — DULOXETIN HYDROCHLORIDE 30 MG/1
30 CAPSULE, DELAYED RELEASE ORAL DAILY
Qty: 90 CAPSULE | Refills: 1 | Status: SHIPPED | OUTPATIENT
Start: 2025-02-07

## 2025-02-16 LAB
CORTISOL - AM: 5.1 UG/DL (ref 6.2–19.4)
FOLATE: 10.3 NG/ML
FOLLICLE STIMULATING HORMONE: 108 MIU/ML
IRON % SATURATION: 12 % (ref 15–55)
IRON: 43 UG/DL (ref 27–159)
TOTAL IRON BINDING CAPACITY: 363 UG/DL (ref 250–450)
TSH SERPL DL<=0.05 MIU/L-ACNC: 2.33 UIU/ML (ref 0.45–4.5)
UNSATURATED IRON BINDING CAPACITY: 320 UG/DL (ref 131–425)
VITAMIN B-12: 583 PG/ML (ref 232–1245)
VITAMIN D 25-HYDROXY: 44.2 NG/ML (ref 30–100)

## 2025-02-18 ENCOUNTER — PATIENT MESSAGE (OUTPATIENT)
Dept: INTERNAL MEDICINE CLINIC | Age: 55
End: 2025-02-18

## 2025-02-24 NOTE — TELEPHONE ENCOUNTER
Patient called back and gave her  note. Patient understood and had no further question for provider

## 2025-03-05 RX ORDER — ROSUVASTATIN CALCIUM 10 MG/1
10 TABLET, COATED ORAL NIGHTLY
Qty: 30 TABLET | Refills: 1 | Status: SHIPPED | OUTPATIENT
Start: 2025-03-05

## 2025-03-05 RX ORDER — QUETIAPINE FUMARATE 100 MG/1
TABLET, FILM COATED ORAL
Qty: 60 TABLET | Refills: 1 | Status: SHIPPED | OUTPATIENT
Start: 2025-03-05

## 2025-03-05 NOTE — TELEPHONE ENCOUNTER
Last appointment: 1/9/2025  Next appointment: 7/23/2025  Last refill:   Seroquel: 01/27/2025  Crestor: 10/08/2024

## 2025-03-13 LAB — CORTISOL - AM: 6.8 UG/DL (ref 6.2–19.4)

## 2025-03-15 ENCOUNTER — RESULTS FOLLOW-UP (OUTPATIENT)
Dept: INTERNAL MEDICINE CLINIC | Age: 55
End: 2025-03-15

## 2025-03-20 ENCOUNTER — OFFICE VISIT (OUTPATIENT)
Dept: CARDIOLOGY CLINIC | Age: 55
End: 2025-03-20
Payer: COMMERCIAL

## 2025-03-20 VITALS
BODY MASS INDEX: 29.02 KG/M2 | HEIGHT: 64 IN | WEIGHT: 170 LBS | DIASTOLIC BLOOD PRESSURE: 90 MMHG | RESPIRATION RATE: 16 BRPM | SYSTOLIC BLOOD PRESSURE: 130 MMHG | HEART RATE: 79 BPM

## 2025-03-20 DIAGNOSIS — R00.2 PALPITATIONS: Primary | ICD-10-CM

## 2025-03-20 DIAGNOSIS — I10 PRIMARY HYPERTENSION: ICD-10-CM

## 2025-03-20 PROCEDURE — G2211 COMPLEX E/M VISIT ADD ON: HCPCS | Performed by: INTERNAL MEDICINE

## 2025-03-20 PROCEDURE — G8427 DOCREV CUR MEDS BY ELIG CLIN: HCPCS | Performed by: INTERNAL MEDICINE

## 2025-03-20 PROCEDURE — G9899 SCRN MAM PERF RSLTS DOC: HCPCS | Performed by: INTERNAL MEDICINE

## 2025-03-20 PROCEDURE — 3080F DIAST BP >= 90 MM HG: CPT | Performed by: INTERNAL MEDICINE

## 2025-03-20 PROCEDURE — 3075F SYST BP GE 130 - 139MM HG: CPT | Performed by: INTERNAL MEDICINE

## 2025-03-20 PROCEDURE — 3017F COLORECTAL CA SCREEN DOC REV: CPT | Performed by: INTERNAL MEDICINE

## 2025-03-20 PROCEDURE — 99214 OFFICE O/P EST MOD 30 MIN: CPT | Performed by: INTERNAL MEDICINE

## 2025-03-20 PROCEDURE — G8419 CALC BMI OUT NRM PARAM NOF/U: HCPCS | Performed by: INTERNAL MEDICINE

## 2025-03-20 PROCEDURE — 1036F TOBACCO NON-USER: CPT | Performed by: INTERNAL MEDICINE

## 2025-03-20 RX ORDER — METOPROLOL SUCCINATE 50 MG/1
50 TABLET, EXTENDED RELEASE ORAL 2 TIMES DAILY
Qty: 180 TABLET | Refills: 3 | Status: SHIPPED | OUTPATIENT
Start: 2025-03-20 | End: 2026-03-15

## 2025-03-21 NOTE — PROGRESS NOTES
Cc: palpitations    HPI:     Patient is a 54-year-old woman with history of migraines (currently on multiple medication), \"right eye stroke\" (normal MRI brain ), palpitations, anxiety, depression.     Patient started complaining of racing heartbeats the last month, worse over the last 2 weeks.  The last couple of weeks he has also had chest discomfort with palpitations.  Otherwise he is quite active at home and likes to exercise, does spinning 30 minutes 2 times per week and walks the dogs, without any symptoms.  Patient was seen at  emergency room on 2022 and her work-up was within normal limits.  Her TSH was normal.     ECG 2022: NSR, normal ECG.     CAM  - 2022: Patient reports symptoms during the first day of wearing the monitor.  There were no tachyarrhythmias or ectopies.     Echo 2022: Normal     Patient is here for follow-up.  Patient reports that recently she has noticed increasing blood pressure, weight and episodes of racing heartbeats.  She is compliant with Toprol 25 mg p.o. twice daily.  Her BP at home 130/80-90 mmHg, HR 90s.      Histories     Past Medical History:   has a past medical history of Allergic rhinitis, cause unspecified, Anxiety state, unspecified, Cancer (HCC), Cerebrovascular disease, CVA (cerebral vascular accident) (HCC), Endometriosis, Ganglion cyst, Hemorrhage of rectum and anus, Insomnia, unspecified, Migraine, unspecified, without mention of intractable migraine without mention of status migrainosus, Osteopenia, PAC (premature atrial contraction), Screening mammogram, and Sprain of neck.    Surgical History:   has a past surgical history that includes Colonoscopy (2008); Nasal septum surgery (1993); laparoscopy (2005);  section (1990); Carpal tunnel release; Cervical spine surgery (); Finger trigger release (Right, 2022); Colonoscopy (2023); eye surgery; and Breast biopsy (2024).     Social History:

## 2025-03-25 ENCOUNTER — E-VISIT (OUTPATIENT)
Dept: INTERNAL MEDICINE CLINIC | Age: 55
End: 2025-03-25
Payer: COMMERCIAL

## 2025-03-25 DIAGNOSIS — R11.10 VOMITING IN ADULT: ICD-10-CM

## 2025-03-25 DIAGNOSIS — G43.719 INTRACTABLE CHRONIC MIGRAINE WITHOUT AURA AND WITHOUT STATUS MIGRAINOSUS: Primary | ICD-10-CM

## 2025-03-25 PROCEDURE — 99421 OL DIG E/M SVC 5-10 MIN: CPT | Performed by: INTERNAL MEDICINE

## 2025-03-25 RX ORDER — ONDANSETRON 4 MG/1
4 TABLET, FILM COATED ORAL 3 TIMES DAILY PRN
Qty: 15 TABLET | Refills: 0 | Status: SHIPPED | OUTPATIENT
Start: 2025-03-25

## 2025-03-25 NOTE — PROGRESS NOTES
On this date  I have spent 7 minutes reviewing previous notes, test results and communicating with the patient discussing the diagnosis and importance of compliance with the treatment plan as well as documenting on the day of the visit.

## 2025-04-21 NOTE — PROGRESS NOTES
Well Adult Note  Name: Christianne Chávez Today’s Date: 2024   MRN: [id number] Sex: Female   Age: 53 y.o. Ethnicity: Non- / Non    : 1970 Race: White (non-)      Christianne Chávez is here for a well adult exam.       Subjective   History:  Reviewed her visit with Dr. Huber regarding migraines.  \"Chronic migraine- rain will trigger headaches; she did not get any migraines last month; prior to last month she had 4 days per month; gets nausea and photo/phonophobia; prior to botox she got daily migraines;   No side effects to the botox;   She is taking qulipta at 60 mg po daily;   No missed work or social actitives;   The toradol shot works if she takes it early;   Nurtec works but it does not work great;   Imp  1) Chronic migraine- doing well; much improved with botox\"    We discussed the limited role for Fioricet which is why headache specialist do not generally prescribe it anymore.  We agreed that she can have a prescription of 30 Fiorinal With Codeine per year for very severe headaches.  She states her headaches were better when she was in [place] and in [place].  She thinks this confirms that allergies are somewhat of a trigger.  For her allergies she uses Flovent and her nose, and takes Singulair and Allegra and Astelin nasal spray.    Patient has concerns about weight gain and brought Ozempic from an online source and is going to boot camp.    We discussed that Seroquel she takes for insomnia may be contributing to weight gain.      Review of Systems    Allergies   Allergen Reactions    Adhesive Tape      Mild redness    Penicillins      Prior to Visit Medications    Medication Sig Taking? Authorizing Provider   tiZANidine (ZANAFLEX) 4 MG tablet  Yes Provider, MD Barrie   rosuvastatin (CRESTOR) 10 MG tablet Take 1 tablet by mouth nightly Yes Norma Guzman MD   butalbital-acetaminophen-caffeine (FIORICET, ESGIC) -40 MG per tablet Take 1 tablet by mouth  Amanda Baker MD;  Location: Hardin Memorial Pedro Mcgrath MD, MPH, FACC, [facility]

## 2025-05-12 RX ORDER — DULOXETIN HYDROCHLORIDE 30 MG/1
30 CAPSULE, DELAYED RELEASE ORAL DAILY
Qty: 90 CAPSULE | Refills: 1 | Status: SHIPPED | OUTPATIENT
Start: 2025-05-12

## 2025-05-12 NOTE — TELEPHONE ENCOUNTER
Last appointment: 3/25/2025  Next appointment: Visit date not found    New pt apt with Dr Rodriguez on 7/24/25    Last refill: (2/7/2025) by Norma Guzman MD

## 2025-06-06 RX ORDER — QUETIAPINE FUMARATE 100 MG/1
200 TABLET, FILM COATED ORAL NIGHTLY PRN
Qty: 60 TABLET | Refills: 1 | Status: SHIPPED | OUTPATIENT
Start: 2025-06-06

## 2025-06-06 NOTE — TELEPHONE ENCOUNTER
Last appointment: 3/25/2025  Next appointment: Visit date not found    Still scheduled with Dr Rodriguez on 7/24/25  Fwd to  staff to assist in rescheduling     Last refill: 3/5/2025) by Bro Elmore MD

## 2025-06-06 NOTE — TELEPHONE ENCOUNTER
Reinforce to patient that she definitely needs to establish with new pcp and stay UTD with her neurologist and may need to get established with psychiatry for additional medication refills.

## 2025-06-16 RX ORDER — ROSUVASTATIN CALCIUM 10 MG/1
10 TABLET, COATED ORAL NIGHTLY
Qty: 30 TABLET | Refills: 0 | Status: SHIPPED | OUTPATIENT
Start: 2025-06-16

## 2025-06-16 NOTE — TELEPHONE ENCOUNTER
Last appointment: 3/25/2025  Next appointment: Visit date not found    Last refill:     Message sent to patient reminding to reschedule with new pcp.   Provided names of other [facility] providers as well as Dr Elmore.    Pended  day supply only

## 2025-07-14 ENCOUNTER — PATIENT MESSAGE (OUTPATIENT)
Dept: INTERNAL MEDICINE CLINIC | Age: 55
End: 2025-07-14

## 2025-07-15 RX ORDER — ROSUVASTATIN CALCIUM 10 MG/1
10 TABLET, COATED ORAL NIGHTLY
Qty: 30 TABLET | Refills: 0 | Status: SHIPPED | OUTPATIENT
Start: 2025-07-15

## 2025-07-15 NOTE — TELEPHONE ENCOUNTER
Last appointment: 3/25/2025  Next appointment:7/31/2025 w/ Dr. Elmore  Last refill: 6/16/2025

## 2025-07-18 DIAGNOSIS — G43.719 INTRACTABLE CHRONIC MIGRAINE WITHOUT AURA AND WITHOUT STATUS MIGRAINOSUS: Primary | ICD-10-CM

## 2025-07-18 RX ORDER — BUTALBITAL, ASPIRIN, AND CAFFEINE 325; 50; 40 MG/1; MG/1; MG/1
1 CAPSULE ORAL EVERY 4 HOURS PRN
Qty: 42 CAPSULE | Refills: 0 | Status: SHIPPED | OUTPATIENT
Start: 2025-07-18 | End: 2025-08-17

## 2025-07-18 NOTE — TELEPHONE ENCOUNTER
Last appointment: 3/25/2025  Next appointment: 7/31/2025 w/ Dr. Elmore  Last refill: 1/10/2024

## 2025-07-21 DIAGNOSIS — F32.A ANXIETY AND DEPRESSION: ICD-10-CM

## 2025-07-21 DIAGNOSIS — F41.9 ANXIETY AND DEPRESSION: ICD-10-CM

## 2025-07-21 RX ORDER — BUPROPION HYDROCHLORIDE 150 MG/1
150 TABLET ORAL EVERY MORNING
Qty: 90 TABLET | Refills: 0 | Status: SHIPPED | OUTPATIENT
Start: 2025-07-21 | End: 2026-01-17

## 2025-07-21 NOTE — TELEPHONE ENCOUNTER
Last appointment: 3/25/2025  Next appointment: 7/31/2025 w/ Dr. Elmore  Last refill: 1/9/2025  #90 x1

## 2025-07-28 SDOH — HEALTH STABILITY: PHYSICAL HEALTH: ON AVERAGE, HOW MANY DAYS PER WEEK DO YOU ENGAGE IN MODERATE TO STRENUOUS EXERCISE (LIKE A BRISK WALK)?: 2 DAYS

## 2025-07-28 SDOH — HEALTH STABILITY: PHYSICAL HEALTH: ON AVERAGE, HOW MANY MINUTES DO YOU ENGAGE IN EXERCISE AT THIS LEVEL?: 30 MIN

## 2025-07-31 ENCOUNTER — OFFICE VISIT (OUTPATIENT)
Dept: INTERNAL MEDICINE CLINIC | Age: 55
End: 2025-07-31
Payer: COMMERCIAL

## 2025-07-31 VITALS
OXYGEN SATURATION: 95 % | TEMPERATURE: 98 F | HEART RATE: 83 BPM | DIASTOLIC BLOOD PRESSURE: 80 MMHG | BODY MASS INDEX: 30.11 KG/M2 | WEIGHT: 175.4 LBS | SYSTOLIC BLOOD PRESSURE: 130 MMHG

## 2025-07-31 DIAGNOSIS — M54.2 NECK PAIN: ICD-10-CM

## 2025-07-31 DIAGNOSIS — E78.5 HYPERLIPIDEMIA, UNSPECIFIED HYPERLIPIDEMIA TYPE: ICD-10-CM

## 2025-07-31 DIAGNOSIS — F41.9 ANXIETY: Primary | ICD-10-CM

## 2025-07-31 DIAGNOSIS — I10 PRIMARY HYPERTENSION: ICD-10-CM

## 2025-07-31 DIAGNOSIS — F32.9 MAJOR DEPRESSIVE DISORDER, REMISSION STATUS UNSPECIFIED, UNSPECIFIED WHETHER RECURRENT: ICD-10-CM

## 2025-07-31 DIAGNOSIS — R00.2 PALPITATIONS: ICD-10-CM

## 2025-07-31 DIAGNOSIS — H81.10 BENIGN PAROXYSMAL POSITIONAL VERTIGO, UNSPECIFIED LATERALITY: ICD-10-CM

## 2025-07-31 DIAGNOSIS — G43.719 INTRACTABLE CHRONIC MIGRAINE WITHOUT AURA AND WITHOUT STATUS MIGRAINOSUS: ICD-10-CM

## 2025-07-31 DIAGNOSIS — Z86.73 HISTORY OF CVA (CEREBROVASCULAR ACCIDENT): ICD-10-CM

## 2025-07-31 DIAGNOSIS — F51.04 PSYCHOPHYSIOLOGICAL INSOMNIA: ICD-10-CM

## 2025-07-31 DIAGNOSIS — J30.9 ALLERGIC RHINITIS, UNSPECIFIED SEASONALITY, UNSPECIFIED TRIGGER: ICD-10-CM

## 2025-07-31 PROCEDURE — 99215 OFFICE O/P EST HI 40 MIN: CPT | Performed by: INTERNAL MEDICINE

## 2025-07-31 PROCEDURE — G9899 SCRN MAM PERF RSLTS DOC: HCPCS | Performed by: INTERNAL MEDICINE

## 2025-07-31 PROCEDURE — G8417 CALC BMI ABV UP PARAM F/U: HCPCS | Performed by: INTERNAL MEDICINE

## 2025-07-31 PROCEDURE — 3017F COLORECTAL CA SCREEN DOC REV: CPT | Performed by: INTERNAL MEDICINE

## 2025-07-31 PROCEDURE — G8427 DOCREV CUR MEDS BY ELIG CLIN: HCPCS | Performed by: INTERNAL MEDICINE

## 2025-07-31 PROCEDURE — 3079F DIAST BP 80-89 MM HG: CPT | Performed by: INTERNAL MEDICINE

## 2025-07-31 PROCEDURE — 1036F TOBACCO NON-USER: CPT | Performed by: INTERNAL MEDICINE

## 2025-07-31 PROCEDURE — 3075F SYST BP GE 130 - 139MM HG: CPT | Performed by: INTERNAL MEDICINE

## 2025-07-31 ASSESSMENT — ENCOUNTER SYMPTOMS
BLOOD IN STOOL: 0
VOMITING: 0
COUGH: 0
ABDOMINAL PAIN: 0
NAUSEA: 0
SHORTNESS OF BREATH: 0
SORE THROAT: 0

## 2025-07-31 NOTE — PROGRESS NOTES
Christianne Chávez (:  1970) is a 54 y.o. female, New patient, here for evaluation of the following chief complaint(s):  New Patient (No CC), Anxiety, and Establish Care      Assessment & Plan   ASSESSMENT/PLAN:  1. Anxiety  - Stable   - Continue current dose of Cymbalta  2. Major depressive disorder, remission status unspecified, unspecified whether recurrent  - Stable   - Continue current dose of Cymbalta and Wellbutrin  3. Intractable chronic migraine without aura and without status migrainosus  - Stable   - Continue current dose of as needed Fiorinal.  Follows with neurology for management  4. Hyperlipidemia, unspecified hyperlipidemia type  - Stable   - Continue current dose of rosuvastatin   5. Palpitations  - Stable   - Continue current dose of metoprolol.  Follows with cardiology.  6. Primary hypertension  - Stable   - Continue current dose of metoprolol  7. Psychophysiological insomnia  - Stable   - Continue current dose of Seroquel  8. Allergic rhinitis, unspecified seasonality, unspecified trigger  - Stable   - Continue current dose of Singulair  9. History of CVA (cerebrovascular accident)  - Stable   - Continue current dose of aspirin and rosuvastatin  10. Neck pain  - Stable   - Continue current dose of Zanaflex  11. Benign paroxysmal positional vertigo, unspecified laterality  Chronic, improving  Patient would like to do physical therapy for Epley maneuver, referral provided.  -     [facility]      Return in about 3 months (around 10/31/2025).         Subjective   SUBJECTIVE/OBJECTIVE:  Anxiety  Presents for initial visit. The problem has been gradually improving. Patient reports no chest pain, dizziness, nausea, palpitations or shortness of breath. The severity of symptoms is mild.     Her past medical history is significant for anxiety/panic attacks and depression. Past treatments include non-SSRI antidepressants. The treatment provided significant relief. Compliance with

## 2025-08-12 ENCOUNTER — PATIENT MESSAGE (OUTPATIENT)
Dept: INTERNAL MEDICINE CLINIC | Age: 55
End: 2025-08-12

## 2025-08-12 DIAGNOSIS — R00.2 PALPITATIONS: Primary | ICD-10-CM

## 2025-08-12 RX ORDER — ROSUVASTATIN CALCIUM 10 MG/1
10 TABLET, COATED ORAL NIGHTLY
Qty: 90 TABLET | Refills: 1 | Status: SHIPPED | OUTPATIENT
Start: 2025-08-12

## 2025-08-20 RX ORDER — QUETIAPINE FUMARATE 100 MG/1
TABLET, FILM COATED ORAL
Qty: 60 TABLET | Refills: 1 | Status: SHIPPED | OUTPATIENT
Start: 2025-08-20

## (undated) DEVICE — TOWEL,STOP FLAG GOLD N-W: Brand: MEDLINE

## (undated) DEVICE — SUTURE ETHLN SZ 5-0 L18IN NONABSORBABLE BLK L19MM PS-2 3/8 1666G

## (undated) DEVICE — TRAY,IRRIGATION,BULBSYRINGE,60ML,CSR,PVP: Brand: MEDLINE

## (undated) DEVICE — GOWN,SIRUS,POLYRNF,BRTHSLV,XLN/XL,20/CS: Brand: MEDLINE

## (undated) DEVICE — COVER LT HNDL BLU PLAS

## (undated) DEVICE — BANDAGE,GAUZE,CONFORMING,3"X75",STRL,LF: Brand: MEDLINE

## (undated) DEVICE — HAND: Brand: MEDLINE INDUSTRIES, INC.

## (undated) DEVICE — SHEET,DRAPE,53X77,STERILE: Brand: MEDLINE

## (undated) DEVICE — COVER,TABLE,HEAVY DUTY,77"X90",STRL: Brand: MEDLINE

## (undated) DEVICE — GLOVE ORTHO 7 1/2   MSG9475

## (undated) DEVICE — GARMENT,MEDLINE,DVT,INT,CALF,MED, GEN2: Brand: MEDLINE

## (undated) DEVICE — UNDERGLOVE SURG SZ 8 BLU LTX FREE SYN POLYISOPRENE POLYMER

## (undated) DEVICE — PADDING CAST N ADH 4 YDX2 IN HIGHLY ABSORBENT EZ APPL SOFROL

## (undated) DEVICE — BLADE OPHTH 180DEG CUT SURF BLU STR SHRP DBL BVL GRINDLESS